# Patient Record
Sex: FEMALE | Race: WHITE | URBAN - METROPOLITAN AREA
[De-identification: names, ages, dates, MRNs, and addresses within clinical notes are randomized per-mention and may not be internally consistent; named-entity substitution may affect disease eponyms.]

---

## 2021-02-24 ENCOUNTER — TRANSFERRED RECORDS (OUTPATIENT)
Dept: HEALTH INFORMATION MANAGEMENT | Facility: CLINIC | Age: 76
End: 2021-02-24

## 2024-02-07 ENCOUNTER — LAB REQUISITION (OUTPATIENT)
Dept: LAB | Facility: CLINIC | Age: 79
End: 2024-02-07
Payer: COMMERCIAL

## 2024-02-07 DIAGNOSIS — Z11.1 ENCOUNTER FOR SCREENING FOR RESPIRATORY TUBERCULOSIS: ICD-10-CM

## 2024-02-08 ENCOUNTER — TRANSITIONAL CARE UNIT VISIT (OUTPATIENT)
Dept: GERIATRICS | Facility: CLINIC | Age: 79
End: 2024-02-08
Payer: COMMERCIAL

## 2024-02-08 ENCOUNTER — DOCUMENTATION ONLY (OUTPATIENT)
Dept: GERIATRICS | Facility: CLINIC | Age: 79
End: 2024-02-08
Payer: COMMERCIAL

## 2024-02-08 VITALS
OXYGEN SATURATION: 97 % | TEMPERATURE: 97.1 F | DIASTOLIC BLOOD PRESSURE: 77 MMHG | WEIGHT: 123 LBS | HEART RATE: 78 BPM | RESPIRATION RATE: 20 BRPM | SYSTOLIC BLOOD PRESSURE: 157 MMHG

## 2024-02-08 DIAGNOSIS — Z86.59 HISTORY OF DEPRESSION: ICD-10-CM

## 2024-02-08 DIAGNOSIS — K59.01 SLOW TRANSIT CONSTIPATION: ICD-10-CM

## 2024-02-08 DIAGNOSIS — F41.1 GAD (GENERALIZED ANXIETY DISORDER): ICD-10-CM

## 2024-02-08 DIAGNOSIS — J21.0 RSV (ACUTE BRONCHIOLITIS DUE TO RESPIRATORY SYNCYTIAL VIRUS): ICD-10-CM

## 2024-02-08 DIAGNOSIS — Z86.59 HISTORY OF POSTTRAUMATIC STRESS DISORDER (PTSD): ICD-10-CM

## 2024-02-08 DIAGNOSIS — I10 ESSENTIAL HYPERTENSION: ICD-10-CM

## 2024-02-08 DIAGNOSIS — F51.05: ICD-10-CM

## 2024-02-08 DIAGNOSIS — I27.20 PULMONARY HYPERTENSION, UNSPECIFIED (H): ICD-10-CM

## 2024-02-08 DIAGNOSIS — S72.112D CLOSED DISPLACED FRACTURE OF GREATER TROCHANTER OF LEFT FEMUR WITH ROUTINE HEALING, SUBSEQUENT ENCOUNTER: ICD-10-CM

## 2024-02-08 DIAGNOSIS — G89.29 CHRONIC LOW BACK PAIN, UNSPECIFIED BACK PAIN LATERALITY, UNSPECIFIED WHETHER SCIATICA PRESENT: ICD-10-CM

## 2024-02-08 DIAGNOSIS — C90.00 MULTIPLE MYELOMA NOT HAVING ACHIEVED REMISSION (H): ICD-10-CM

## 2024-02-08 DIAGNOSIS — M54.50 CHRONIC LOW BACK PAIN, UNSPECIFIED BACK PAIN LATERALITY, UNSPECIFIED WHETHER SCIATICA PRESENT: ICD-10-CM

## 2024-02-08 DIAGNOSIS — G43.909 MIGRAINE WITHOUT STATUS MIGRAINOSUS, NOT INTRACTABLE, UNSPECIFIED MIGRAINE TYPE: ICD-10-CM

## 2024-02-08 DIAGNOSIS — M80.00XS AGE-RELATED OSTEOPOROSIS WITH CURRENT PATHOLOGICAL FRACTURE, SEQUELA: ICD-10-CM

## 2024-02-08 DIAGNOSIS — M51.369 DDD (DEGENERATIVE DISC DISEASE), LUMBAR: ICD-10-CM

## 2024-02-08 DIAGNOSIS — R05.3 CHRONIC COUGH: ICD-10-CM

## 2024-02-08 DIAGNOSIS — S52.502D CLOSED FRACTURE OF DISTAL END OF LEFT RADIUS WITH ROUTINE HEALING, UNSPECIFIED FRACTURE MORPHOLOGY, SUBSEQUENT ENCOUNTER: Primary | ICD-10-CM

## 2024-02-08 PROBLEM — R19.5 LOOSE STOOLS: Status: ACTIVE | Noted: 2023-04-20

## 2024-02-08 PROBLEM — H35.3132 NONEXUDATIVE AGE-RELATED MACULAR DEGENERATION, BILATERAL, INTERMEDIATE DRY STAGE: Status: ACTIVE | Noted: 2021-04-14

## 2024-02-08 PROBLEM — R33.8 ACUTE URINARY RETENTION: Status: ACTIVE | Noted: 2020-03-11

## 2024-02-08 PROBLEM — R53.83 FATIGUE: Status: ACTIVE | Noted: 2024-02-08

## 2024-02-08 PROBLEM — D59.12: Status: ACTIVE | Noted: 2021-05-21

## 2024-02-08 PROBLEM — H43.813 PVD (POSTERIOR VITREOUS DETACHMENT), BOTH EYES: Status: ACTIVE | Noted: 2021-04-14

## 2024-02-08 PROBLEM — A41.9 SEPSIS WITH ACUTE HYPOXIC RESPIRATORY FAILURE WITHOUT SEPTIC SHOCK, DUE TO UNSPECIFIED ORGANISM (H): Status: ACTIVE | Noted: 2022-09-13

## 2024-02-08 PROBLEM — J18.9 PNEUMONIA OF RIGHT LOWER LOBE DUE TO INFECTIOUS ORGANISM: Status: ACTIVE | Noted: 2021-09-15

## 2024-02-08 PROBLEM — N30.00 ACUTE CYSTITIS WITHOUT HEMATURIA: Status: ACTIVE | Noted: 2024-02-02

## 2024-02-08 PROBLEM — T40.2X1A OPIOID OVERDOSE, ACCIDENTAL OR UNINTENTIONAL, INITIAL ENCOUNTER (H): Status: ACTIVE | Noted: 2022-06-22

## 2024-02-08 PROBLEM — J96.01 SEPSIS WITH ACUTE HYPOXIC RESPIRATORY FAILURE WITHOUT SEPTIC SHOCK, DUE TO UNSPECIFIED ORGANISM (H): Status: ACTIVE | Noted: 2022-09-13

## 2024-02-08 PROBLEM — I38 CARDIAC VALVULOPATHY: Status: ACTIVE | Noted: 2021-08-13

## 2024-02-08 PROBLEM — G47.00 INSOMNIA: Status: ACTIVE | Noted: 2020-03-13

## 2024-02-08 PROBLEM — Z87.19 HISTORY OF GI BLEED: Status: ACTIVE | Noted: 2023-01-11

## 2024-02-08 PROBLEM — D47.2 SMOLDERING MULTIPLE MYELOMA (SMM): Status: ACTIVE | Noted: 2021-05-21

## 2024-02-08 PROBLEM — R65.20 SEPSIS WITH ACUTE HYPOXIC RESPIRATORY FAILURE WITHOUT SEPTIC SHOCK, DUE TO UNSPECIFIED ORGANISM (H): Status: ACTIVE | Noted: 2022-09-13

## 2024-02-08 PROBLEM — M80.00XA AGE-RELATED OSTEOPOROSIS WITH CURRENT PATHOLOGICAL FRACTURE: Chronic | Status: ACTIVE | Noted: 2024-02-02

## 2024-02-08 PROBLEM — S72.113A: Status: ACTIVE | Noted: 2024-02-02

## 2024-02-08 PROBLEM — R05.9 COUGH: Status: ACTIVE | Noted: 2019-07-30

## 2024-02-08 PROBLEM — R06.09 DYSPNEA ON EXERTION: Status: ACTIVE | Noted: 2022-09-13

## 2024-02-08 PROBLEM — M54.9 BACK PAIN: Status: ACTIVE | Noted: 2024-02-08

## 2024-02-08 PROBLEM — M35.3 POLYMYALGIA RHEUMATICA (H): Chronic | Status: ACTIVE | Noted: 2020-03-13

## 2024-02-08 PROBLEM — K52.1 DIARRHEA DUE TO DRUG: Status: ACTIVE | Noted: 2024-02-08

## 2024-02-08 PROBLEM — S62.102A CLOSED FRACTURE OF LEFT WRIST: Status: ACTIVE | Noted: 2024-02-02

## 2024-02-08 PROBLEM — B33.8 RSV (RESPIRATORY SYNCYTIAL VIRUS INFECTION): Status: ACTIVE | Noted: 2024-02-01

## 2024-02-08 PROCEDURE — 99306 1ST NF CARE HIGH MDM 50: CPT | Performed by: NURSE PRACTITIONER

## 2024-02-08 PROCEDURE — P9604 ONE-WAY ALLOW PRORATED TRIP: HCPCS | Mod: ORL | Performed by: NURSE PRACTITIONER

## 2024-02-08 PROCEDURE — 86481 TB AG RESPONSE T-CELL SUSP: CPT | Mod: ORL | Performed by: NURSE PRACTITIONER

## 2024-02-08 PROCEDURE — 36415 COLL VENOUS BLD VENIPUNCTURE: CPT | Mod: ORL | Performed by: NURSE PRACTITIONER

## 2024-02-08 RX ORDER — FLUTICASONE PROPIONATE 50 MCG
1 SPRAY, SUSPENSION (ML) NASAL DAILY
COMMUNITY
End: 2024-02-08

## 2024-02-08 RX ORDER — BENZONATATE 100 MG/1
100 CAPSULE ORAL 3 TIMES DAILY PRN
COMMUNITY

## 2024-02-08 RX ORDER — ONDANSETRON 4 MG/1
4 TABLET, ORALLY DISINTEGRATING ORAL 2 TIMES DAILY
COMMUNITY

## 2024-02-08 RX ORDER — METOPROLOL SUCCINATE 100 MG/1
100 TABLET, EXTENDED RELEASE ORAL DAILY
COMMUNITY

## 2024-02-08 RX ORDER — TRAZODONE HYDROCHLORIDE 50 MG/1
100 TABLET, FILM COATED ORAL
COMMUNITY

## 2024-02-08 RX ORDER — BUPRENORPHINE HYDROCHLORIDE AND NALOXONE HYDROCHLORIDE DIHYDRATE 2; .5 MG/1; MG/1
1 TABLET SUBLINGUAL EVERY MORNING
COMMUNITY

## 2024-02-08 RX ORDER — SERTRALINE HYDROCHLORIDE 100 MG/1
200 TABLET, FILM COATED ORAL DAILY
COMMUNITY

## 2024-02-08 RX ORDER — POLYETHYLENE GLYCOL 3350 17 G/17G
17 POWDER, FOR SOLUTION ORAL DAILY PRN
COMMUNITY

## 2024-02-08 RX ORDER — IPRATROPIUM BROMIDE 21 UG/1
2 SPRAY, METERED NASAL 3 TIMES DAILY
COMMUNITY

## 2024-02-08 RX ORDER — AMLODIPINE BESYLATE 5 MG/1
5 TABLET ORAL DAILY
COMMUNITY

## 2024-02-08 RX ORDER — MULTIVITAMIN WITH IRON
100 TABLET ORAL DAILY
COMMUNITY

## 2024-02-08 RX ORDER — BISACODYL 10 MG
10 SUPPOSITORY, RECTAL RECTAL DAILY PRN
COMMUNITY

## 2024-02-08 RX ORDER — ESTRADIOL 0.1 MG/G
CREAM VAGINAL DAILY
COMMUNITY

## 2024-02-08 RX ORDER — ACETAMINOPHEN 500 MG
1000 TABLET ORAL 3 TIMES DAILY
COMMUNITY

## 2024-02-08 RX ORDER — PANTOPRAZOLE SODIUM 40 MG/1
40 TABLET, DELAYED RELEASE ORAL 2 TIMES DAILY
COMMUNITY

## 2024-02-08 RX ORDER — NARATRIPTAN 2.5 MG/1
2.5 TABLET ORAL
COMMUNITY

## 2024-02-08 RX ORDER — POTASSIUM CHLORIDE 1500 MG/1
20 TABLET, EXTENDED RELEASE ORAL 2 TIMES DAILY
COMMUNITY

## 2024-02-08 RX ORDER — LISINOPRIL 5 MG/1
5 TABLET ORAL DAILY
COMMUNITY

## 2024-02-08 RX ORDER — HYDROMORPHONE HYDROCHLORIDE 2 MG/1
1 TABLET ORAL
COMMUNITY

## 2024-02-08 NOTE — LETTER
2/8/2024        RE: German Sharpe  [address]  [facility] [address]        M [facility] GERIATRICS    PRIMARY CARE PROVIDER AND CLINIC:  Coni Rodriguez, [facility]  Chief Complaint   Patient presents with     Hospital F/U      [facility] Medical Record Number:  [id number]  Place of Service where encounter took place:  [facility] (TCU) [[id number]]    German Sharpe  is a 79 year old  (1945), admitted to the above facility from  [facility] . Hospital stay 2/1 through 2/7..  German had a fall 1/27 while walking her dog.  Noted L wrist pain and gradual increase of L hip pain, eventually presented to ED on 2/1 and was diagnosed with L greater trochanter fracture and L distal radius and avulsion fracture of ulnar styloid.  Ortho consulted, non-operative management with NWB to LUE in cast and WBAT to LLE with no crossing midline or active hip abduction.  Upon admission was found to have RSV-- symptomatic with generalized weakness and cough but stable on RA and chest XR clear.  UTI ruled out.  Had some soft blood pressures, amlodipine and lisinopril was held but resumed on discharge.  Of note, she is on suboxone for chronic back pain.  She participated in therapies at hospital, made progress but was recommended to discharge to TCU given need for moderate assist with dressing (forgets hip precautions), CGA toileting, doffing brief, moderate assist to don brief, unsteadiness with gait, assist of 1 with platform walker, ambulating 40' x2.  PT note indicates she would need 24/7 supervision on discharge and if [facility] could not provide would require TCU.  Only new med at discharge: hydromorphone 1mg q3h PRN.  Only med change: acetaminophen frequency updated to 1000mg TID.      Today's Visit:  German is seen in her room, sitting EOB with walker.  LUE in short arm cast.  She reports she is doing well today, does not understand why she is at TCU and wants to return  "to her AL.  States, \"I am doing everything independently\" and is frustrated that staff are telling her she needs to ask for help to bathroom.  Writer explained hospital therapy recommended TCU to regain strength and functional mobility prior to returning to her apartment.  Reports 3/10 pain to LUE.  Pain to LLE with movement/weight bearing.  Well-managed with acetaminophen TID and her suboxone.  Patient has not requested hydromorphone since arrival to TCU.  First therapy session this AM.  German denies cough or shortness of breath.  Denies chest pain, palpitations.  Denies constipation or diarrhea.  Requests her Miralax be switched to PRN as she is having no issues with constipation (reports she was refusing it in hospital as well).  Reports normal appetite, no nausea.  Slept well last night although felt she was woken up frequently by staff.      CODE STATUS/ADVANCE DIRECTIVES DISCUSSION:  DNR / DNI  ALLERGIES:   Allergies   Allergen Reactions     Blood-Group Specific Substance Unknown     Other Reaction(s): *Unknown, Other (see comments)    Pt has a non-specific antibody.  Blood product orders may be delayed.  Draw one red top and two purple top tubes for all Type and Crossmatch/Type and Screen orders.      Pt has a non-specific antibody.  Blood product orders may be delayed.  Draw one red top and two purple top tubes for all Type and Crossmatch/Type and Screen orders.      Other reaction(s): Other (see comments) Pt has a non-specific antibody.  Blood product orders may be delayed.  Draw one red top and two purple top tubes for all Type and Crossmatch/Type and Screen orders.    Pt has a non-specific antibody.  Blood product orders may be delayed.  Draw one red top and two purple top tubes for all Type and Crossmatch/Type and Screen orders.    Pt has a non-specific antibody.  Blood product orders may be delayed.  Draw one red top and two purple top tubes for all Type and Crossmatch/Type and Screen orders.     Nsaids " Nausea and Vomiting     Other Reaction(s): Gastrointestinal      PAST MEDICAL HISTORY:   Past Medical History:   Diagnosis Date     Anxiety      Chronic back pain      H/O malignant neoplasm of breast      Hypertension      Insomnia      Multiple myeloma not having achieved remission (H)      Paraesophageal hernia      PTSD (post-traumatic stress disorder)      Pulmonary hypertension (H)       PAST SURGICAL HISTORY:   has a past surgical history that includes Lumpectomy breast (Right, );  section (); hysterectomy, rosita; appendectomy; and Spinal Fusion.  FAMILY HISTORY: family history includes Alcoholism in her father; Hypertension in her brother; Osteosarcoma in her brother.  SOCIAL HISTORY:   reports that she has quit smoking. Her smoking use included cigarettes. She does not have any smokeless tobacco history on file.  Patient's living condition: lives in an assisted living facility    Post Discharge Medication Reconciliation Status:   MED REC REQUIRED  Post Medication Reconciliation Status: discharge medications reconciled and changed, per note/orders       Current Outpatient Medications   Medication Sig     acetaminophen (TYLENOL) 500 MG tablet Take 1,000 mg by mouth 3 times daily     amLODIPine (NORVASC) 5 MG tablet Take 5 mg by mouth daily     benzonatate (TESSALON) 100 MG capsule Take 100 mg by mouth 3 times daily as needed for cough     bisacodyl (DULCOLAX) 10 MG suppository Place 10 mg rectally daily as needed for constipation     buprenorphine-naloxone (SUBOXONE) 2-0.5 MG SUBL sublingual tablet Place 1 tablet under the tongue every morning And give 0.5 tablet sublingually in the evening for Pain     Calcium Carbonate Antacid (CALCIUM CARBONATE PO) Take by mouth daily Calcium + Vitamin D3 Oral Tablet 600-5 MG-MCG     estradiol (ESTRACE) 0.1 MG/GM vaginal cream Place vaginally daily     galcanezumab-gnlm (EMGALITY) 120 MG/ML injection Inject 120 mg Subcutaneous every 28 days     HYDROmorphone  (DILAUDID) 2 MG tablet Take 1 mg by mouth every 3 hours as needed for severe pain     ipratropium (ATROVENT) 0.03 % nasal spray Spray 2 sprays into both nostrils 3 times daily     lisinopril (ZESTRIL) 5 MG tablet Take 5 mg by mouth daily     metoprolol succinate ER (TOPROL XL) 100 MG 24 hr tablet Take 100 mg by mouth daily     Multiple Vitamins-Minerals (SYSTANE ICAPS AREDS2 PO) Take 1 tablet by mouth 2 times daily     naratriptan (AMERGE) 2.5 MG tablet Take 2.5 mg by mouth at onset of headache for migraine     ondansetron (ZOFRAN ODT) 4 MG ODT tab Take 4 mg by mouth 2 times daily     pantoprazole (PROTONIX) 40 MG EC tablet Take 40 mg by mouth 2 times daily     polyethylene glycol (MIRALAX) 17 g packet Take 17 g by mouth daily as needed for constipation     potassium chloride ER (K-TAB) 20 MEQ CR tablet Take 20 mEq by mouth 2 times daily     psyllium (METAMUCIL/KONSYL) 58.6 % powder Take by mouth daily     sertraline (ZOLOFT) 100 MG tablet Take 200 mg by mouth daily     traZODone (DESYREL) 50 MG tablet Take 100 mg by mouth nightly as needed for sleep     vitamin B6 (PYRIDOXINE) 100 MG tablet Take 100 mg by mouth daily     No current facility-administered medications for this visit.       ROS:  No chest pain, shortness of breath, fevers, chills, headache, nausea, vomiting, dysuria or bowel abnormalities.  Appetite is  normal.  No pain except 3-4/10 in L wrist and L hip/leg.    Vitals:  BP (!) 157/77   Pulse 78   Temp 97.1  F (36.2  C)   Resp 20   Wt 55.8 kg (123 lb)   SpO2 97%   Exam:  GENERAL APPEARANCE:  Alert, in no distress, cooperative  ENT:  Mouth and posterior oropharynx normal, moist mucous membranes, [place]  RESP:  respiratory effort and palpation of chest normal, lungs clear to auscultation , no respiratory distress  CV:  Palpation and auscultation of heart done , regular rate and rhythm, no murmur, rub, or gallop, no edema  ABDOMEN:  normal bowel sounds, soft, nontender, no hepatosplenomegaly or other  masses  M/S:   Short arm cast to LUE-- fingers warm to touch with intact ROM, no edema, slight ecchymosis to 2nd and 3rd base of finger.  LLE:  No tenderness with palpation along lateral thigh, able to lift leg with knee flexed but unable to perform SLR d/t pain.  SKIN:  No visible lesions or rashes.  No incisions.  No wounds per nursing  PSYCH:  oriented X 3    Lab/Diagnostic data:  Done at [facility]             ASSESSMENT/PLAN:  (S5.057D) Closed fracture of distal end of left radius with routine healing, unspecified fracture morphology, subsequent encounter  (primary encounter diagnosis)  (S72.112D) Closed displaced fracture of greater trochanter of left femur with routine healing, subsequent encounter  (M80.00XS) Age-related osteoporosis with current pathological fracture, sequela  Stable.  DOI: 1/27, mechanical fall while walking dog.  Non-operative management with WBAT to LLE and short arm cast to LUE NWB.  Pain ranging 3-5/10 per nursing and patient report.  Has not requested hydromorphone since admission to TCU.  [facility]-- appears they have their Geriatric Ortho Outreach team following her.  PCP ordered a DEXA scan 1/15 prior to her injuries, no previous DEXA scans.  Per PCP, German is open to treatment for osteoporosis and provider is thinking of Reclast > alendronate given history of GI issues.    Plan:   -WBAT to left lower extremity-- no active abduction or crossing L leg over midline  -NWB to left upper extremity  -Pain mgmt with acetaminophen 1000mg TID, hydromorphone 1mg q3h PRN, and her baseline suboxone 2-0.5mg (1 tab in AM and 1/2 tab in PM)  -Patient requests diclofenac be discontinued as she does not use at baseline  -Continue calcium + D3 supplement daily  -PT/OT  -Geriatric Ortho Outreach through [facility] to follow orthopaedically, anticipate repeat x-rays in 2-3 weeks  -Should see PCP or  [facility] upon TCU discharge for initiation of bisphosphonate  for her osteoporosis    (C90.00) Multiple myeloma not having achieved remission (H)  Chronic.  Diagnosed in 2021.  Was following with [facility] (Dr. Richardson) previously and had undergone treatment with chemotherapy: bortezomib, lenalidomide and dexamethasone, venetoclax.   Does not appear she has had any treatment since 2022. Per PCP note from Jan 2024, she is now following with a provider at [facility] in [place].    Plan:   -Follow-up with Oncology provider upon discharge from TCU    (M51.36) DDD (degenerative disc disease), lumbar  (M54.50,  G89.29) Chronic low back pain, unspecified back pain laterality, unspecified whether sciatica present  Chronic.  Follows with Dr. Andersen at [facility] for chronic continuous use of opioids.  Patient has history of two opioid overdoses, most recent in June 2022.  Started on Suboxone by Dr. Andersen in July 2022 with dose increased to 3mg April 2023.  No longer taking oral opioids and has not been utilizing the hydromorphone prescribed with most recent hospitalization.  Plan:   -Writer to provide refills of Suboxone during TCU stay, patient will follow-up with Dr. Andersen upon discharge for ongoing refills  -PT/OT  -Acetaminophen 1000mg TID ongoing    (J21.0) RSV (acute bronchiolitis due to respiratory syncytial virus)  (R05.3) Chronic cough  Stable.  RSV resolving, no symptoms presently.  Fatigue per her baseline.  O2 sats >93% on RA since admission to TCU.  History of chronic cough, although she denies issues presently.  TCU infection control nurse to assess for lifting of isolation precautions today.  Plan:   -Isolation precautions likely ending today  -Encourage activity/ambulation     (I10) Essential hypertension  (I27.20) Pulmonary hypertension, unspecified (H)  Acute on chronic.  Primary provider had noted 1/15 that patient's BP was elevated at clinic and had advised more frequent checks by patient's [facility] staff-- unclear if this was done.  No med adjustments made at  that time.  Since arrival to TCU, systolic BP has ranged 119-157.  [facility]once noting that she has been more agitated (per staff) today and wants to leave AMA, feels she does not need TCU, this may be contributing to her elevated BP.    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  Presently she is more frustrated with being in TCU setting and wants to return home to her dog.  Reports her dog is going to visit her today which she is looking forward to.  Has met with mental health provider in the past virtually, not established with anyone presently but does not feel it is necessary.    Plan:   -Continue sertraline 200mg daily  -Continue trazadone 100mg at HS PRN insomnia  -Avoid screens 30 minutes prior to bedtime  -Staff to keep her informed on her progress towards discharge back to her [facility] (goals to be met, etc)    (K59.01) Slow transit constipation  Chronic.  Denies issues presently, states she has daily bowel movements without issue.  Reports she refused Miralax in the hospital and DOES NOT want it scheduled here, even every other day.  Counseled that writer will make it PRN so it is available if she needs it.  Plan:   -Encourage fluids and physical activity  -Continue psyllium daily  -Change Miralax from scheduled every other day to daily PRN    (G43.909) Migraine without status migrainosus, not intractable, unspecified migraine type  Chronic, stable.  Presently managed with monthly Emgality injections, although unclear when last one was administered and if she self administers or her [facility] staff.  No information in EPIC or Care Everywhere.  Also uses naratriptan 2.5mg PRN onset headache.  Denies recent  migraines.  Plan:   -Continue PRN naratriptan 2.5mg, 1 tab at onset of headache  -TCU to clarify when patient last received Emgality injection (check with AL), hold on administering until it is due (every 28 days)    -----------------------------------------------------------------------------    Orders:  -Change Miralax from scheduled every other day to daily PRN constipation  -Discontinue fluticasone and diclofenac (patient states she does not use)        Electronically signed by:  BILLY Wang CNP                    Sincerely,        BILLY Wang CNP

## 2024-02-08 NOTE — PROGRESS NOTES
"[facility] GERIATRICS    PRIMARY CARE PROVIDER AND CLINIC:  Coni Rodriguez, [facility]  Chief Complaint   Patient presents with    Hospital F/U      [facility] Medical Record Number:  [id number]  Place of Service where encounter took place:  [facility] [[id number]]    German Sharpe  is a 79 year old  (1945), admitted to the above facility from  [facility] . Hospital stay 2/1 through 2/7..  German had a fall 1/27 while walking her dog.  Noted L wrist pain and gradual increase of L hip pain, eventually presented to ED on 2/1 and was diagnosed with L greater trochanter fracture and L distal radius and avulsion fracture of ulnar styloid.  Ortho consulted, non-operative management with NWB to LUE in cast and WBAT to LLE with no crossing midline or active hip abduction.  Upon admission was found to have RSV-- symptomatic with generalized weakness and cough but stable on RA and chest XR clear.  UTI ruled out.  Had some soft blood pressures, amlodipine and lisinopril was held but resumed on discharge.  Of note, she is on suboxone for chronic back pain.  She participated in therapies at hospital, made progress but was recommended to discharge to TCU given need for moderate assist with dressing (forgets hip precautions), CGA toileting, doffing brief, moderate assist to don brief, unsteadiness with gait, assist of 1 with platform walker, ambulating 40' x2.  PT note indicates she would need 24/7 supervision on discharge and if [facility] could not provide would require TCU.  Only new med at discharge: hydromorphone 1mg q3h PRN.  Only med change: acetaminophen frequency updated to 1000mg TID.      Today's Visit:  German is seen in her room, sitting EOB with walker.  LUE in short arm cast.  She reports she is doing well today, does not understand why she is at TCU and wants to return to her AL.  States, \"I am doing everything independently\" and is frustrated that staff are " telling her she needs to ask for help to bathroom.  Writer explained hospital therapy recommended TCU to regain strength and functional mobility prior to returning to her apartment.  Reports 3/10 pain to LUE.  Pain to LLE with movement/weight bearing.  Well-managed with acetaminophen TID and her suboxone.  Patient has not requested hydromorphone since arrival to TCU.  First therapy session this AM.  German denies cough or shortness of breath.  Denies chest pain, palpitations.  Denies constipation or diarrhea.  Requests her Miralax be switched to PRN as she is having no issues with constipation (reports she was refusing it in hospital as well).  Reports normal appetite, no nausea.  Slept well last night although felt she was woken up frequently by staff.      CODE STATUS/ADVANCE DIRECTIVES DISCUSSION:  DNR / DNI  ALLERGIES:   Allergies   Allergen Reactions    Blood-Group Specific Substance Unknown     Other Reaction(s): *Unknown, Other (see comments)    Pt has a non-specific antibody.  Blood product orders may be delayed.  Draw one red top and two purple top tubes for all Type and Crossmatch/Type and Screen orders.      Pt has a non-specific antibody.  Blood product orders may be delayed.  Draw one red top and two purple top tubes for all Type and Crossmatch/Type and Screen orders.      Other reaction(s): Other (see comments) Pt has a non-specific antibody.  Blood product orders may be delayed.  Draw one red top and two purple top tubes for all Type and Crossmatch/Type and Screen orders.    Pt has a non-specific antibody.  Blood product orders may be delayed.  Draw one red top and two purple top tubes for all Type and Crossmatch/Type and Screen orders.    Pt has a non-specific antibody.  Blood product orders may be delayed.  Draw one red top and two purple top tubes for all Type and Crossmatch/Type and Screen orders.    Nsaids Nausea and Vomiting     Other Reaction(s): Gastrointestinal      PAST MEDICAL HISTORY:    Past Medical History:   Diagnosis Date    Anxiety     Chronic back pain     H/O malignant neoplasm of breast     Hypertension     Insomnia     Multiple myeloma not having achieved remission (H)     Paraesophageal hernia     PTSD (post-traumatic stress disorder)     Pulmonary hypertension (H)       PAST SURGICAL HISTORY:   has a past surgical history that includes Lumpectomy breast (Right, );  section (); hysterectomy, rosita; appendectomy; and Spinal Fusion.  FAMILY HISTORY: family history includes Alcoholism in her father; Hypertension in her brother; Osteosarcoma in her brother.  SOCIAL HISTORY:   reports that she has quit smoking. Her smoking use included cigarettes. She does not have any smokeless tobacco history on file.  Patient's living condition: lives in an assisted living facility    Post Discharge Medication Reconciliation Status:   MED REC REQUIRED  Post Medication Reconciliation Status: discharge medications reconciled and changed, per note/orders       Current Outpatient Medications   Medication Sig    acetaminophen (TYLENOL) 500 MG tablet Take 1,000 mg by mouth 3 times daily    amLODIPine (NORVASC) 5 MG tablet Take 5 mg by mouth daily    benzonatate (TESSALON) 100 MG capsule Take 100 mg by mouth 3 times daily as needed for cough    bisacodyl (DULCOLAX) 10 MG suppository Place 10 mg rectally daily as needed for constipation    buprenorphine-naloxone (SUBOXONE) 2-0.5 MG SUBL sublingual tablet Place 1 tablet under the tongue every morning And give 0.5 tablet sublingually in the evening for Pain    Calcium Carbonate Antacid (CALCIUM CARBONATE PO) Take by mouth daily Calcium + Vitamin D3 Oral Tablet 600-5 MG-MCG    estradiol (ESTRACE) 0.1 MG/GM vaginal cream Place vaginally daily    galcanezumab-gnlm (EMGALITY) 120 MG/ML injection Inject 120 mg Subcutaneous every 28 days    HYDROmorphone (DILAUDID) 2 MG tablet Take 1 mg by mouth every 3 hours as needed for severe pain    ipratropium  (ATROVENT) 0.03 % nasal spray Spray 2 sprays into both nostrils 3 times daily    lisinopril (ZESTRIL) 5 MG tablet Take 5 mg by mouth daily    metoprolol succinate ER (TOPROL XL) 100 MG 24 hr tablet Take 100 mg by mouth daily    Multiple Vitamins-Minerals (SYSTANE ICAPS AREDS2 PO) Take 1 tablet by mouth 2 times daily    naratriptan (AMERGE) 2.5 MG tablet Take 2.5 mg by mouth at onset of headache for migraine    ondansetron (ZOFRAN ODT) 4 MG ODT tab Take 4 mg by mouth 2 times daily    pantoprazole (PROTONIX) 40 MG EC tablet Take 40 mg by mouth 2 times daily    polyethylene glycol (MIRALAX) 17 g packet Take 17 g by mouth daily as needed for constipation    potassium chloride ER (K-TAB) 20 MEQ CR tablet Take 20 mEq by mouth 2 times daily    psyllium (METAMUCIL/KONSYL) 58.6 % powder Take by mouth daily    sertraline (ZOLOFT) 100 MG tablet Take 200 mg by mouth daily    traZODone (DESYREL) 50 MG tablet Take 100 mg by mouth nightly as needed for sleep    vitamin B6 (PYRIDOXINE) 100 MG tablet Take 100 mg by mouth daily     No current facility-administered medications for this visit.       ROS:  No chest pain, shortness of breath, fevers, chills, headache, nausea, vomiting, dysuria or bowel abnormalities.  Appetite is  normal.  No pain except 3-4/10 in L wrist and L hip/leg.    Vitals:  BP (!) 157/77   Pulse 78   Temp 97.1  F (36.2  C)   Resp 20   Wt 55.8 kg (123 lb)   SpO2 97%   Exam:  GENERAL APPEARANCE:  Alert, in no distress, cooperative  ENT:  Mouth and posterior oropharynx normal, moist mucous membranes, [place]  RESP:  respiratory effort and palpation of chest normal, lungs clear to auscultation , no respiratory distress  CV:  Palpation and auscultation of heart done , regular rate and rhythm, no murmur, rub, or gallop, no edema  ABDOMEN:  normal bowel sounds, soft, nontender, no hepatosplenomegaly or other masses  M/S:   Short arm cast to LUE-- fingers warm to touch with intact ROM, no edema, slight ecchymosis to 2nd  and 3rd base of finger.  LLE:  No tenderness with palpation along lateral thigh, able to lift leg with knee flexed but unable to perform SLR d/t pain.  SKIN:  No visible lesions or rashes.  No incisions.  No wounds per nursing  PSYCH:  oriented X 3    Lab/Diagnostic data:  Done at [facility]             ASSESSMENT/PLAN:  (S52.850D) Closed fracture of distal end of left radius with routine healing, unspecified fracture morphology, subsequent encounter  (primary encounter diagnosis)  (S72.112D) Closed displaced fracture of greater trochanter of left femur with routine healing, subsequent encounter  (M80.00XS) Age-related osteoporosis with current pathological fracture, sequela  Stable.  DOI: 1/27, mechanical fall while walking dog.  Non-operative management with WBAT to LLE and short arm cast to LUE NWB.  Pain ranging 3-5/10 per nursing and patient report.  Has not requested hydromorphone since admission to TCU.  Unclear when she is due to follow-up with [facility]-- appears they have their Geriatric Ortho Outreach team following her.  PCP ordered a DEXA scan 1/15 prior to her injuries, no previous DEXA scans.  Per PCP, German is open to treatment for osteoporosis and provider is thinking of Reclast > alendronate given history of GI issues.    Plan:   -WBAT to left lower extremity-- no active abduction or crossing L leg over midline  -NWB to left upper extremity  -Pain mgmt with acetaminophen 1000mg TID, hydromorphone 1mg q3h PRN, and her baseline suboxone 2-0.5mg (1 tab in AM and 1/2 tab in PM)  -Patient requests diclofenac be discontinued as she does not use at baseline  -Continue calcium + D3 supplement daily  -PT/OT  -Geriatric Ortho Outreach through [facility] to follow orthopaedically, anticipate repeat x-rays in 2-3 weeks  -Should see PCP or  [facility] upon TCU discharge for initiation of bisphosphonate for her osteoporosis    (C90.00) Multiple myeloma not having achieved remission (H)  Chronic.  Diagnosed in  2021.  Was following with [place] (Dr. Richardson) previously and had undergone treatment with chemotherapy: bortezomib, lenalidomide and dexamethasone, venetoclax.   Does not appear she has had any treatment since 2022. Per PCP note from Jan 2024, she is now following with a provider at [facility] in [place].    Plan:   -Follow-up with Oncology provider upon discharge from TCU    (M51.36) DDD (degenerative disc disease), lumbar  (M54.50,  G89.29) Chronic low back pain, unspecified back pain laterality, unspecified whether sciatica present  Chronic.  Follows with Dr. Andersen at [facility] for chronic continuous use of opioids.  Patient has history of two opioid overdoses, most recent in June 2022.  Started on Suboxone by Dr. Andersen in July 2022 with dose increased to 3mg April 2023.  No longer taking oral opioids and has not been utilizing the hydromorphone prescribed with most recent hospitalization.  Plan:   -Writer to provide refills of Suboxone during TCU stay, patient will follow-up with Dr. Andersen upon discharge for ongoing refills  -PT/OT  -Acetaminophen 1000mg TID ongoing    (J21.0) RSV (acute bronchiolitis due to respiratory syncytial virus)  (R05.3) Chronic cough  Stable.  RSV resolving, no symptoms presently.  Fatigue per her baseline.  O2 sats >93% on RA since admission to TCU.  History of chronic cough, although she denies issues presently.  TCU infection control nurse to assess for lifting of isolation precautions today.  Plan:   -Isolation precautions likely ending today  -Encourage activity/ambulation     (I10) Essential hypertension  (I27.20) Pulmonary hypertension, unspecified (H)  Acute on chronic.  Primary provider had noted 1/15 that patient's BP was elevated at clinic and had advised more frequent checks by patient's [facility] staff-- unclear if this was done.  No med adjustments made at that time.  Since arrival to TCU, systolic BP has ranged 119-157.  [place] noting that she has been more agitated  (per staff) today and wants to leave AMA, feels she does not need TCU, this may be contributing to her elevated BP.    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  Presently she is more frustrated with being in TCU setting and wants to return home to her dog.  Reports her dog is going to visit her today which she is looking forward to.  Has met with mental health provider in the past virtually, not established with anyone presently but does not feel it is necessary.    Plan:   -Continue sertraline 200mg daily  -Continue trazadone 100mg at HS PRN insomnia  -Avoid screens 30 minutes prior to bedtime  -Staff to keep her informed on her progress towards discharge back to her longterm (goals to be met, etc)    (K59.01) Slow transit constipation  Chronic.  Denies issues presently, states she has daily bowel movements without issue.  Reports she refused Miralax in the hospital and DOES NOT want it scheduled here, even every other day.  Counseled that writer will make it PRN so it is available if she needs it.  Plan:   -Encourage fluids and physical activity  -Continue psyllium daily  -Change Miralax from scheduled every other day to daily PRN    (G43.909) Migraine without status migrainosus, not intractable, unspecified migraine type  Chronic, stable.  Presently managed with monthly Emgality injections, although unclear when last one was administered and if she self administers or her longterm staff.  No information in EPIC or Care Everywhere.  Also uses naratriptan 2.5mg PRN onset headache.  Denies recent migraines.  Plan:   -Continue PRN naratriptan 2.5mg, 1 tab at onset of headache  -TCU to clarify when patient last  received Emgality injection (check with AL), hold on administering until it is due (every 28 days)    -----------------------------------------------------------------------------    Orders:  -Change Miralax from scheduled every other day to daily PRN constipation  -Discontinue fluticasone and diclofenac (patient states she does not use)        Electronically signed by:  Sherley Lopez, BILLY CNP

## 2024-02-09 LAB
QUANTIFERON MITOGEN: 10 IU/ML
QUANTIFERON TB1 TUBE: 0.02 IU/ML
QUANTIFERON TB2 TUBE: 0.03

## 2024-02-10 LAB
GAMMA INTERFERON BACKGROUND BLD IA-ACNC: 0.02 IU/ML
M TB IFN-G BLD-IMP: NEGATIVE
M TB IFN-G CD4+ BCKGRND COR BLD-ACNC: 9.98 IU/ML
MITOGEN IGNF BCKGRD COR BLD-ACNC: 0 IU/ML
MITOGEN IGNF BCKGRD COR BLD-ACNC: 0.01 IU/ML
QUANTIFERON NIL TUBE: 0.02 IU/ML

## 2024-02-12 ENCOUNTER — TRANSITIONAL CARE UNIT VISIT (OUTPATIENT)
Dept: GERIATRICS | Facility: CLINIC | Age: 79
End: 2024-02-12
Payer: COMMERCIAL

## 2024-02-12 VITALS
OXYGEN SATURATION: 97 % | HEART RATE: 77 BPM | SYSTOLIC BLOOD PRESSURE: 134 MMHG | RESPIRATION RATE: 16 BRPM | DIASTOLIC BLOOD PRESSURE: 72 MMHG | WEIGHT: 121.1 LBS | TEMPERATURE: 97.3 F

## 2024-02-12 DIAGNOSIS — Z86.59 HISTORY OF DEPRESSION: ICD-10-CM

## 2024-02-12 DIAGNOSIS — G43.909 MIGRAINE WITHOUT STATUS MIGRAINOSUS, NOT INTRACTABLE, UNSPECIFIED MIGRAINE TYPE: ICD-10-CM

## 2024-02-12 DIAGNOSIS — I10 ESSENTIAL HYPERTENSION: ICD-10-CM

## 2024-02-12 DIAGNOSIS — R41.89 COGNITIVE IMPAIRMENT: ICD-10-CM

## 2024-02-12 DIAGNOSIS — C90.00 MULTIPLE MYELOMA NOT HAVING ACHIEVED REMISSION (H): ICD-10-CM

## 2024-02-12 DIAGNOSIS — S52.502D CLOSED FRACTURE OF DISTAL END OF LEFT RADIUS WITH ROUTINE HEALING, UNSPECIFIED FRACTURE MORPHOLOGY, SUBSEQUENT ENCOUNTER: Primary | ICD-10-CM

## 2024-02-12 DIAGNOSIS — G89.29 CHRONIC LOW BACK PAIN, UNSPECIFIED BACK PAIN LATERALITY, UNSPECIFIED WHETHER SCIATICA PRESENT: ICD-10-CM

## 2024-02-12 DIAGNOSIS — K59.01 SLOW TRANSIT CONSTIPATION: ICD-10-CM

## 2024-02-12 DIAGNOSIS — S72.112D CLOSED DISPLACED FRACTURE OF GREATER TROCHANTER OF LEFT FEMUR WITH ROUTINE HEALING, SUBSEQUENT ENCOUNTER: ICD-10-CM

## 2024-02-12 DIAGNOSIS — M80.00XS AGE-RELATED OSTEOPOROSIS WITH CURRENT PATHOLOGICAL FRACTURE, SEQUELA: ICD-10-CM

## 2024-02-12 DIAGNOSIS — M54.50 CHRONIC LOW BACK PAIN, UNSPECIFIED BACK PAIN LATERALITY, UNSPECIFIED WHETHER SCIATICA PRESENT: ICD-10-CM

## 2024-02-12 DIAGNOSIS — J21.0 RSV (ACUTE BRONCHIOLITIS DUE TO RESPIRATORY SYNCYTIAL VIRUS): ICD-10-CM

## 2024-02-12 DIAGNOSIS — F41.1 GAD (GENERALIZED ANXIETY DISORDER): ICD-10-CM

## 2024-02-12 PROCEDURE — 99316 NF DSCHRG MGMT 30 MIN+: CPT | Performed by: NURSE PRACTITIONER

## 2024-02-12 NOTE — LETTER
2/12/2024        RE: German Sharpe  [address]  [facility] [address]        [facility] GERIATRICS DISCHARGE SUMMARY  PATIENT'S NAME: German Sharpe  YOB: 1945  MEDICAL RECORD NUMBER:  [id number]  Place of Service where encounter took place:  [facility] (TCU) [[id number]]    PRIMARY CARE PROVIDER AND CLINIC RESPONSIBLE AFTER TRANSFER: Coni Rodriguez, [facility]  Physician    Non-[facility] Provider     Transferring providers: BILLY Wang CNP, Dr. Mateo Virgen MD  Recent Hospitalization/ED:  [facility]  stay 2/1 to 2/7.  Date of SNF Admission: February 07, 2024  Date of SNF (anticipated) Discharge: February 13, 2024  Discharged to: previous assisted living  Cognitive Scores: SLUMS: 20/30  Physical Function: Ambulating 150 ft with platform walker  DME: DME F2F done today 2/12 for platform walker    CODE STATUS/ADVANCE DIRECTIVES DISCUSSION:  DNR/DNI  ALLERGIES: Blood-group specific substance and Nsaids    NURSING FACILITY COURSE   Medication Changes/Rationale:   Fluticasone discontinued- patient states she does not use at baseline  Diclofenac gel discontinued- patient states she does not use at baseline  Miralax changed from scheduled to PRN- patient without constipation, infrequent use of narcotics    Summary of nursing facility stay:   (S52502D) Closed fracture of distal end of left radius with routine healing, unspecified fracture morphology, subsequent encounter  (primary encounter diagnosis)  (S72.654D) Closed displaced fracture of greater trochanter of left femur with routine healing, subsequent encounter  Primary reason for TCU admission.  Hospital felt she was not safe to return home without additional therapies.  Patient has not felt TCU was necessary for the duration of her stay and was threatening to leave A since arrival.  Has participated in therapies and made progress, did reach goals to facilitate safe  discharge home with ongoing in-home therapy services to further the recovery.  Has used dilaudid twice during TCU stay, primarily managing with acetaminophen and her baseline suboxone.    Plan:   -Follow-up with [facility] in 2 weeks for repeat x-rays wrist and hip (patient/family still need to schedule)  -Continue pain management w/ acetaminophen, PRN dilaudid (no further refills anticipated as being needed)  -WBAT to LLE, NWB to LUE  -Should see PCP or  [facility] upon [facility]CU discharge for initiation of bisphosphonate for her osteoporosis   - PT/OT upon discharge    (J21.0) RSV (acute bronchiolitis due to respiratory syncytial virus)  Diagnosed on admission to hospital 2/1. RSV resolving, no symptoms presently.  O2 sats >93% on RA since admission to TCU.  History of chronic cough, although she denies issues presently.  Remains on isolation precautions in TCU with anticipated end date of 2/13.     (R41.89) Cognitive impairment  SLUMS scoring at TCU 20/30 indicating cognitive impairment.  Some concerns regarding cognition noted while at hospital but no testing was done and there was documentation that thought it may be related to hearing loss.  Some confusion also noted prior to hospitalization per family.  Plan:  -HH OT to follow and do more cognitive testing in her familiar home setting    (M54.50,  G89.29) Chronic low back pain, unspecified back pain laterality, unspecified whether sciatica present  No issues at TCU.  Follows with Dr. Andersen at [facility] for chronic continuous use of opioids.    Plan:   -Patient to f/up with Dr. Andersen ([facility]) upon discharge for ongoing refills of suboxone      (I10) Essential hypertension  A couple episodes of elevated blood pressures during TCU stay:  2/8 (08:34): 157/77  2/9 (09:34): 175/88  2/11 (07:53): 160/73  All other readings SBP <140.  Plan:   -Follow-up with PCP within 7-10 days post TCU discharge  -Continue current cardiac meds w/out change:  amlodipine 5mg daily, lisinopril 5mg daily, metoprolol 100mg daily    (F41.1) CRISTEL (generalized anxiety disorder)  (Z86.59) History of depression  (K59.01) Slow transit constipation  No issues during TCU stay.    Plan:   -Continue sertraline 200mg daily  -Continue trazadone 100mg at HS PRN insomnia      (G43.909) Migraine without status migrainosus, not intractable, unspecified migraine type  No issues during TCU stay.  -Continue PRN naratriptan 2.5mg, 1 tab at onset of headache  -Emgality injections every 28 days ongoing     Discharge Medications:  MED REC REQUIRED  Post Medication Reconciliation Status: discharge medications reconciled, continue medications without change       Current Outpatient Medications   Medication Sig Dispense Refill     acetaminophen (TYLENOL) 500 MG tablet Take 1,000 mg by mouth 3 times daily       amLODIPine (NORVASC) 5 MG tablet Take 5 mg by mouth daily       benzonatate (TESSALON) 100 MG capsule Take 100 mg by mouth 3 times daily as needed for cough       bisacodyl (DULCOLAX) 10 MG suppository Place 10 mg rectally daily as needed for constipation       buprenorphine-naloxone (SUBOXONE) 2-0.5 MG SUBL sublingual tablet Place 1 tablet under the tongue every morning And give 0.5 tablet sublingually in the evening for Pain       Calcium Carbonate Antacid (CALCIUM CARBONATE PO) Take by mouth daily Calcium + Vitamin D3 Oral Tablet 600-5 MG-MCG       estradiol (ESTRACE) 0.1 MG/GM vaginal cream Place vaginally daily       galcanezumab-gnlm (EMGALITY) 120 MG/ML injection Inject 120 mg Subcutaneous every 28 days       HYDROmorphone (DILAUDID) 2 MG tablet Take 1 mg by mouth every 3 hours as needed for severe pain       ipratropium (ATROVENT) 0.03 % nasal spray Spray 2 sprays into both nostrils 3 times daily       lisinopril (ZESTRIL) 5 MG tablet Take 5 mg by mouth daily       metoprolol succinate ER (TOPROL XL) 100 MG 24 hr tablet Take 100 mg by mouth daily       Multiple Vitamins-Minerals (SYSTANE  ICAPS AREDS2 PO) Take 1 tablet by mouth 2 times daily       naratriptan (AMERGE) 2.5 MG tablet Take 2.5 mg by mouth at onset of headache for migraine       ondansetron (ZOFRAN ODT) 4 MG ODT tab Take 4 mg by mouth 2 times daily       pantoprazole (PROTONIX) 40 MG EC tablet Take 40 mg by mouth 2 times daily       polyethylene glycol (MIRALAX) 17 g packet Take 17 g by mouth daily as needed for constipation       potassium chloride ER (K-TAB) 20 MEQ CR tablet Take 20 mEq by mouth 2 times daily       psyllium (METAMUCIL/KONSYL) 58.6 % powder Take by mouth daily       sertraline (ZOLOFT) 100 MG tablet Take 200 mg by mouth daily       traZODone (DESYREL) 50 MG tablet Take 100 mg by mouth nightly as needed for sleep       vitamin B6 (PYRIDOXINE) 100 MG tablet Take 100 mg by mouth daily          Controlled medications:   Medication: hydromorphone , 5 tabs given to patient at the time of discharge to take home       Past Medical History:   Past Medical History:   Diagnosis Date     Anxiety      Chronic back pain      H/O malignant neoplasm of breast      Hypertension      Insomnia      Multiple myeloma not having achieved remission (H)      Paraesophageal hernia      PTSD (post-traumatic stress disorder)      Pulmonary hypertension (H)      Physical Exam:   Vitals: /72   Pulse 77   Temp 97.3  F (36.3  C)   Resp 16   Wt 54.9 kg (121 lb 1.6 oz)   SpO2 97%   BMI: There is no height or weight on file to calculate BMI.  GENERAL APPEARANCE:  Alert, in no distress, cooperative  ENT:  Mouth and posterior oropharynx normal, moist mucous membranes, [place]  RESP:  respiratory effort and palpation of chest normal, lungs clear to auscultation   CV:  Palpation and auscultation of heart done , regular rate and rhythm, no murmur, rub, or gallop, no edema  ABDOMEN:  normal bowel sounds, soft, nontender, no hepatosplenomegaly or other masses  M/S:   LUE in short arm cast.  No edema visible to fingers/hand.  LLE weakness with  SLR  PSYCH:  Oriented x3    SNF labs: No labs done during TCU stay    DISCHARGE PLAN:  Follow up labs: No labs orders/due  Medical Follow Up (Appointments need to be scheduled)   Follow up with primary care provider in 1-2 weeks   Follow up with ortho ([facility]) in 2 weeks   Follow up with [facility]/PCP for osteoporosis tx  Discharge Services: Home Care:  Occupational Therapy and Physical Therapy  Discharge Instructions Verbalized to Patient at Discharge:   Weight bearing restrictions:  WBAT to L leg, NWB to L arm (OK for platform walker).     TOTAL DISCHARGE TIME:   Greater than 30 minutes  Electronically signed by:  BILLY Wang CNP     Home care Face to Face documentation done in [employer] attached to Home care orders for [facility].           Face to Face and Medical Necessity Statement for DME Provider visit    Demographic Information on German Sharpe:  Gender: female  : 1945  [address]  [facility] [address]  [phone] (home)     Medical Record: [id number]  Social Security Number: [id number]  Primary Care Provider: Dr. Coni Rodriguez  Insurance: Payor: [employer] / Plan: [employer] MEDICARE ADVANTAGE / Product Type: HMO /     HPI:   German Sharpe is a 79 year old  (1945), who is being seen today for a face to face provider visit at [facility]; medical necessity statement for DME included. This patient requires the following:  DME Ordered and Medical Necessity Statement    The patient has a mobility limitation of non-weight bearing left arm and recent fracture to left hip  that significantly impairs her ability to participate in one or more mobility-related activities of daily living in the home. The patient is able to safely use the walker and the functional mobility deficit can be sufficiently resolved with the use of a platform walker     Pt needing above DME with expected length of need of 99  years  due to medical necessity associated  with following diagnosis:  -Displaced fracture of greater trochanter of left femur  -Fracture of distal end of left radius      Orders:  1. Adult walker with wheels with LEFT platform attachment      ELECTRONICALLY SIGNED BY TETO CERTIFIED PROVIDER:  BILLY Wang CNP   NPI: [id number]  [facility]  [address] [place] [id number]           Sincerely,        BILLY Wang CNP

## 2024-02-12 NOTE — PROGRESS NOTES
[facility] GERIATRICS DISCHARGE SUMMARY  PATIENT'S NAME: German Sharpe  YOB: 1945  MEDICAL RECORD NUMBER:  [id number]  Place of Service where encounter took place:  [facility] (TCU) [[id number]]    PRIMARY CARE PROVIDER AND CLINIC RESPONSIBLE AFTER TRANSFER: Coni Rodriguez, [facility]  Physician    Non-G Provider     Transferring providers: BILLY Wang CNP, Dr. Mateo Virgen MD  Recent Hospitalization/ED:  [facility]  stay 2/1 to 2/7.  Date of SNF Admission: February 07, 2024  Date of SNF (anticipated) Discharge: February 13, 2024  Discharged to: previous assisted living  Cognitive Scores: SLUMS: 20/30  Physical Function: Ambulating 150 ft with platform walker  DME: DME F2F done today 2/12 for platform walker    CODE STATUS/ADVANCE DIRECTIVES DISCUSSION:  DNR/DNI  ALLERGIES: Blood-group specific substance and Nsaids    NURSING FACILITY COURSE   Medication Changes/Rationale:   Fluticasone discontinued- patient states she does not use at baseline  Diclofenac gel discontinued- patient states she does not use at baseline  Miralax changed from scheduled to PRN- patient without constipation, infrequent use of narcotics    Summary of nursing facility stay:   (S58.171D) Closed fracture of distal end of left radius with routine healing, unspecified fracture morphology, subsequent encounter  (primary encounter diagnosis)  (S72.059D) Closed displaced fracture of greater trochanter of left femur with routine healing, subsequent encounter  Primary reason for TCU admission.  Hospital felt she was not safe to return home without additional therapies.  Patient has not felt TCU was necessary for the duration of her stay and was threatening to leave [facility] since arrival.  Has participated in therapies and made progress, did reach goals to facilitate safe discharge home with ongoing in-home therapy services to further the recovery.  Has used  dilaudid twice during TCU stay, primarily managing with acetaminophen and her baseline suboxone.    Plan:   -Follow-up with [facility] in 2 weeks for repeat x-rays wrist and hip (patient/family still need to schedule)  -Continue pain management w/ acetaminophen, PRN dilaudid (no further refills anticipated as being needed)  -WBAT to LLE, NWB to LUE  -Should [facility] or  [facility] upon [facility] discharge for initiation of bisphosphonate for her osteoporosis   - PT/OT upon discharge    (J21.0) RSV (acute bronchiolitis due to respiratory syncytial virus)  Diagnosed on admission to hospital 2/1. RSV resolving, no symptoms presently.  O2 sats >93% on RA since admission to TCU.  History of chronic cough, although she denies issues presently.  Remains on isolation precautions in TCU with anticipated end date of 2/13.     (R41.89) Cognitive impairment  SLUMS scoring at TCU 20/30 indicating cognitive impairment.  Some concerns regarding cognition noted while at hospital but no testing was done and there was documentation that thought it may be related to hearing loss.  Some confusion also noted prior to hospitalization per family.  Plan:  -HH OT to follow and do more cognitive testing in her familiar home setting    (M54.50,  G89.29) Chronic low back pain, unspecified back pain laterality, unspecified whether sciatica present  No issues at TCU.  Follows with Dr. Andersen at [facility] for chronic continuous use of opioids.    Plan:   -Patient to f/up with Dr. Andersen ([facility]) upon discharge for ongoing refills of suboxone      (I10) Essential hypertension  A couple episodes of elevated blood pressures during TCU stay:  2/8 (08:34): 157/77  2/9 (09:34): 175/88  2/11 (07:53): 160/73  All other readings SBP <140.  Plan:   -Follow-up with PCP within 7-10 days post TCU discharge  -Continue current cardiac meds w/out change: amlodipine 5mg daily, lisinopril 5mg daily, metoprolol 100mg daily    (F41.1) CRISTEL (generalized  anxiety disorder)  (Z86.59) History of depression  (K59.01) Slow transit constipation  No issues during TCU stay.    Plan:   -Continue sertraline 200mg daily  -Continue trazadone 100mg at HS PRN insomnia      (G43.909) Migraine without status migrainosus, not intractable, unspecified migraine type  No issues during TCU stay.  -Continue PRN naratriptan 2.5mg, 1 tab at onset of headache  -Emgality injections every 28 days ongoing     Discharge Medications:  MED REC REQUIRED  Post Medication Reconciliation Status: discharge medications reconciled, continue medications without change       Current Outpatient Medications   Medication Sig Dispense Refill    acetaminophen (TYLENOL) 500 MG tablet Take 1,000 mg by mouth 3 times daily      amLODIPine (NORVASC) 5 MG tablet Take 5 mg by mouth daily      benzonatate (TESSALON) 100 MG capsule Take 100 mg by mouth 3 times daily as needed for cough      bisacodyl (DULCOLAX) 10 MG suppository Place 10 mg rectally daily as needed for constipation      buprenorphine-naloxone (SUBOXONE) 2-0.5 MG SUBL sublingual tablet Place 1 tablet under the tongue every morning And give 0.5 tablet sublingually in the evening for Pain      Calcium Carbonate Antacid (CALCIUM CARBONATE PO) Take by mouth daily Calcium + Vitamin D3 Oral Tablet 600-5 MG-MCG      estradiol (ESTRACE) 0.1 MG/GM vaginal cream Place vaginally daily      galcanezumab-gnlm (EMGALITY) 120 MG/ML injection Inject 120 mg Subcutaneous every 28 days      HYDROmorphone (DILAUDID) 2 MG tablet Take 1 mg by mouth every 3 hours as needed for severe pain      ipratropium (ATROVENT) 0.03 % nasal spray Spray 2 sprays into both nostrils 3 times daily      lisinopril (ZESTRIL) 5 MG tablet Take 5 mg by mouth daily      metoprolol succinate ER (TOPROL XL) 100 MG 24 hr tablet Take 100 mg by mouth daily      Multiple Vitamins-Minerals (SYSTANE ICAPS AREDS2 PO) Take 1 tablet by mouth 2 times daily      naratriptan (AMERGE) 2.5 MG tablet Take 2.5 mg  by mouth at onset of headache for migraine      ondansetron (ZOFRAN ODT) 4 MG ODT tab Take 4 mg by mouth 2 times daily      pantoprazole (PROTONIX) 40 MG EC tablet Take 40 mg by mouth 2 times daily      polyethylene glycol (MIRALAX) 17 g packet Take 17 g by mouth daily as needed for constipation      potassium chloride ER (K-TAB) 20 MEQ CR tablet Take 20 mEq by mouth 2 times daily      psyllium (METAMUCIL/KONSYL) 58.6 % powder Take by mouth daily      sertraline (ZOLOFT) 100 MG tablet Take 200 mg by mouth daily      traZODone (DESYREL) 50 MG tablet Take 100 mg by mouth nightly as needed for sleep      vitamin B6 (PYRIDOXINE) 100 MG tablet Take 100 mg by mouth daily          Controlled medications:   Medication: hydromorphone , 5 tabs given to patient at the time of discharge to take home       Past Medical History:   Past Medical History:   Diagnosis Date    Anxiety     Chronic back pain     H/O malignant neoplasm of breast     Hypertension     Insomnia     Multiple myeloma not having achieved remission (H)     Paraesophageal hernia     PTSD (post-traumatic stress disorder)     Pulmonary hypertension (H)      Physical Exam:   Vitals: /72   Pulse 77   Temp 97.3  F (36.3  C)   Resp 16   Wt 54.9 kg (121 lb 1.6 oz)   SpO2 97%   BMI: There is no height or weight on file to calculate BMI.  GENERAL APPEARANCE:  Alert, in no distress, cooperative  ENT:  Mouth and posterior oropharynx normal, moist mucous membranes, [place]  RESP:  respiratory effort and palpation of chest normal, lungs clear to auscultation   CV:  Palpation and auscultation of heart done , regular rate and rhythm, no murmur, rub, or gallop, no edema  ABDOMEN:  normal bowel sounds, soft, nontender, no hepatosplenomegaly or other masses  M/S:   LUE in short arm cast.  No edema visible to fingers/hand.  LLE weakness with SLR  PSYCH:  Oriented x3    SNF labs: No labs done during TCU stay    DISCHARGE PLAN:  Follow up labs: No labs orders/due  Medical  Follow Up (Appointments need to be scheduled)   Follow up with primary care provider in 1-2 weeks   Follow up with ortho ([facility]) in 2 weeks   Follow up with [facility] at [facility]/PCP for osteoporosis tx  Discharge Services: Home Care:  Occupational Therapy and Physical Therapy  Discharge Instructions Verbalized to Patient at Discharge:   Weight bearing restrictions:  WBAT to L leg, NWB to L arm (OK for platform walker).     TOTAL DISCHARGE TIME:   Greater than 30 minutes  Electronically signed by:  BILLY Wang CNP     Home care Face to Face documentation done in [facility] attached to Home care orders for [facility].           Face to Face and Medical Necessity Statement for DME Provider visit    Demographic Information on German Sharpe:  Gender: female  : 1945  [address]  [facility] [address]  [phone] (home)     Medical Record: [id number]  Social Security Number: [id number]  Primary Care Provider: Dr. Coni Rodriguez  Insurance: Payor: [employer] / Plan: [employer] MEDICARE ADVANTAGE / Product Type: HMO /     HPI:   German Sharpe is a 79 year old  (1945), who is being seen today for a face to face provider visit at [facility]; medical necessity statement for DME included. This patient requires the following:  DME Ordered and Medical Necessity Statement    The patient has a mobility limitation of non-weight bearing left arm and recent fracture to left hip  that significantly impairs her ability to participate in one or more mobility-related activities of daily living in the home. The patient is able to safely use the walker and the functional mobility deficit can be sufficiently resolved with the use of a platform walker     Pt needing above DME with expected length of need of 99  years  due to medical necessity associated with following diagnosis:  -Displaced fracture of greater trochanter of left femur  -Fracture of distal end of left  radius      Orders:  1. Adult walker with wheels with LEFT platform attachment      ELECTRONICALLY SIGNED BY TETO CERTIFIED PROVIDER:  BILLY Wang CNP   NPI: [id number]  [facility]  [address] [place] [id number]

## 2024-03-04 ENCOUNTER — DOCUMENTATION ONLY (OUTPATIENT)
Dept: OTHER | Facility: CLINIC | Age: 79
End: 2024-03-04
Payer: COMMERCIAL

## 2024-11-12 ENCOUNTER — APPOINTMENT (OUTPATIENT)
Dept: URBAN - METROPOLITAN AREA CLINIC 255 | Age: 79
Setting detail: DERMATOLOGY
End: 2024-11-19

## 2024-11-12 PROBLEM — C44.01 BASAL CELL CARCINOMA OF SKIN OF LIP: Status: ACTIVE | Noted: 2024-11-12

## 2024-11-12 PROBLEM — C44.219 BASAL CELL CARCINOMA OF SKIN OF LEFT EAR AND EXTERNAL AURICULAR CANAL: Status: ACTIVE | Noted: 2024-11-12

## 2024-11-12 PROCEDURE — OTHER MOHS SURGERY: OTHER

## 2024-11-12 PROCEDURE — OTHER MIPS QUALITY: OTHER

## 2024-11-12 PROCEDURE — 13151 CMPLX RPR E/N/E/L 1.1-2.5 CM: CPT | Mod: 59

## 2024-11-12 PROCEDURE — 17311 MOHS 1 STAGE H/N/HF/G: CPT

## 2024-11-12 PROCEDURE — 14060 TIS TRNFR E/N/E/L 10 SQ CM/<: CPT

## 2024-11-12 PROCEDURE — 17311 MOHS 1 STAGE H/N/HF/G: CPT | Mod: 76

## 2024-11-12 NOTE — PROCEDURE: MIPS QUALITY
Quality 130: Documentation Of Current Medications In The Medical Record: Current Medications Documented
Detail Level: Detailed
Quality 431: Preventive Care And Screening: Unhealthy Alcohol Use - Screening: Patient not identified as an unhealthy alcohol user when screened for unhealthy alcohol use using a systematic screening method
Quality 143: Oncology: Medical And Radiation- Pain Intensity Quantified: Pain severity quantified, no pain present
Quality 226: Preventive Care And Screening: Tobacco Use: Screening And Cessation Intervention: Patient screened for tobacco use and is an ex/non-smoker

## 2024-11-12 NOTE — PROCEDURE: MOHS SURGERY
Show Home Suture Removal Variable: Yes
Mohs Histo Method Verbiage: Each section was then chromacoded and processed in the Mohs lab using the Mohs protocol and submitted for frozen section, utilizing hematoxylin and eosin histochemical stains.
Length To Time In Minutes Device Was In Place: 10
Subsequent Stages Histo Method Verbiage: Using a similar technique to that described above, a thin layer of tissue was removed from all areas where tumor was visible on the previous stage.  The tissue was again oriented, mapped, dyed, and processed as above.
Stage 6: Additional Anesthesia Type: 1% lidocaine with epinephrine
Nasal Turnover Hinge Flap Text: The defect edges were debeveled with a #15 scalpel blade.  Given the size, depth, location of the defect and the defect being full thickness a nasal turnover hinge flap was deemed most appropriate. Using a sterile surgical marker, an appropriate hinge flap was drawn incorporating the defect. The area thus outlined was incised with a #15 scalpel blade. The flap was designed to recreate the nasal mucosal lining and the alar rim. The skin margins were undermined to an appropriate distance in all directions utilizing iris scissors. Following this, the designed flap was carried over into the primary defect and sutured into place
Alternatives Discussed Intro (Do Not Add Period): I discussed alternative treatments to Mohs surgery and specifically discussed the risks and benefits of
Island Pedicle Flap-Requiring Vessel Identification Text: The defect edges were debeveled with a #15 scalpel blade. Given the location of the defect, shape of the defect and the proximity to free margins an island pedicle advancement flap was deemed most appropriate. Using a sterile surgical marker, an appropriate advancement flap was drawn, based on the axial vessel mentioned above, incorporating the defect, outlining the appropriate donor tissue and placing the expected incisions within the relaxed skin tension lines where possible. The area thus outlined was incised deep to adipose tissue with a #15 scalpel blade. The skin margins were undermined to an appropriate distance in all directions around the primary defect and laterally outward around the island pedicle utilizing iris scissors.  There was minimal undermining beneath the pedicle flap. Following this, the designed flap was carried over into the primary defect and sutured into place.
Peng Advancement Flap Text: The defect edges were debeveled with a #15 scalpel blade. Given the location of the defect, shape of the defect and the proximity to free margins a Peng advancement flap was deemed most appropriate. Using a sterile surgical marker, an appropriate advancement flap was drawn incorporating the defect and placing the expected incisions within the relaxed skin tension lines where possible. The area thus outlined was incised deep to adipose tissue with a #15 scalpel blade. The skin margins were undermined to an appropriate distance in all directions utilizing iris scissors. Following this, the designed flap was advanced and carried over into the primary defect and sutured into place.
Oculoplastic Surgeon Procedure Text (A): After obtaining clear surgical margins the patient was sent to oculoplastics for surgical repair.  The patient understands they will receive post-surgical care and follow-up from the referring physician's office.
Cheek-To-Nose Interpolation Flap Text: A decision was made to reconstruct the defect utilizing an interpolation axial flap and a staged reconstruction.  A telfa template was made of the defect.  This telfa template was then used to outline the Cheek-To-Nose Interpolation flap.  The donor area for the pedicle flap was then injected with anesthesia.  The flap was excised through the skin and subcutaneous tissue down to the layer of the underlying musculature.  The interpolation flap was carefully excised within this deep plane to maintain its blood supply.  The edges of the donor site were undermined.   The donor site was closed in a primary fashion.  The pedicle was then rotated into position and sutured.  Once the tube was sutured into place, adequate blood supply was confirmed with blanching and refill.  The pedicle was then wrapped with xeroform gauze and dressed appropriately with a telfa and gauze bandage to ensure continued blood supply and protect the attached pedicle.
Additional Defect Depth In Cm (Optional But Required For Some Insurers): 0
X Size Of Lesion In Cm (Optional): 1
Eyelid Full Thickness Repair - 82932: The eyelid defect was full thickness which required a wedge repair of the eyelid. Special care was taken to ensure that the eyelid margin was realligned when placing sutures.
Consent (Near Eyelid Margin)/Introductory Paragraph: The rationale for Mohs was explained to the patient and consent was obtained. The risks, benefits and alternatives to therapy were discussed in detail. Specifically, the risks of ectropion or eyelid deformity, infection, scarring, bleeding, prolonged wound healing, incomplete removal, allergy to anesthesia, nerve injury and recurrence were addressed. Prior to the procedure, the treatment site was clearly identified and confirmed by the patient. All components of Universal Protocol/PAUSE Rule completed.
Tarsorrhaphy Text: A tarsorrhaphy was performed using Frost sutures.
Complex Requirements: Retention Sutures?: No
Special Stains Stage 10 - Results: Base On Clearance Noted Above
Cartilage Graft Text: The defect edges were debeveled with a #15 scalpel blade. Given the location of the defect, shape of the defect, the fact the defect involved a full thickness cartilage defect a cartilage graft was deemed most appropriate.  An appropriate donor site was identified, cleansed, and anesthetized. The cartilage graft was then harvested and transferred to the recipient site, oriented appropriately and then sutured into place.  The secondary defect was then repaired using a primary closure.
Inflammation Suggestive Of Cancer Camouflage Histology Text: There was a dense lymphocytic infiltrate which prevented adequate histologic evaluation of adjacent structures.
Why Was The Change Made?: Please Select the Appropriate Response
Manual Repair Warning Statement: We plan on removing the manually selected variable below in favor of our much easier automatic structured text blocks found in the previous tab. We decided to do this to help make the flow better and give you the full power of structured data. Manual selection is never going to be ideal in our platform and I would encourage you to avoid using manual selection from this point on, especially since I will be sunsetting this feature. It is important that you do one of two things with the customized text below. First, you can save all of the text in a word file so you can have it for future reference. Second, transfer the text to the appropriate area in the Library tab. Lastly, if there is a flap or graft type which we do not have you need to let us know right away so I can add it in before the variable is hidden. No need to panic, we plan to give you roughly 6 months to make the change.
Detail Level: Detailed
Consent 1/Introductory Paragraph: The rationale for Mohs was explained to the patient and consent was obtained. The risks, benefits and alternatives to therapy were discussed in detail. Specifically, the risks of infection, scarring, bleeding, prolonged wound healing, incomplete removal, allergy to anesthesia, nerve injury and recurrence were addressed. Prior to the procedure, the treatment site was clearly identified and confirmed by the patient. All components of Universal Protocol/PAUSE Rule completed.
Helical Rim Text: The closure involved the helical rim.
Where Do You Want The Question To Include Opioid Counseling Located?: Case Summary Tab
Consent 3/Introductory Paragraph: I gave the patient a chance to ask questions they had about the procedure.  Following this I explained the Mohs procedure and consent was obtained. The risks, benefits and alternatives to therapy were discussed in detail. Specifically, the risks of infection, scarring, bleeding, prolonged wound healing, incomplete removal, allergy to anesthesia, nerve injury and recurrence were addressed. Prior to the procedure, the treatment site was clearly identified and confirmed by the patient. All components of Universal Protocol/PAUSE Rule completed.
Home Suture Removal Text: Patient was provided instructions on removing sutures and will remove their sutures at home.  If they have any questions or difficulties they will call the office.
Mid-Level Procedure Text (C): After obtaining clear surgical margins the patient was sent to a mid-level provider for surgical repair.  The patient understands they will receive post-surgical care and follow-up from the mid-level provider.
Eye Protection Verbiage: Before proceeding with the stage, a plastic scleral shield was inserted. The globe was anesthetized with a few drops of 1% lidocaine with 1:100,000 epinephrine. Then, an appropriate sized scleral shield was chosen and coated with lacrilube ointment. The shield was gently inserted and left in place for the duration of each stage. After the stage was completed, the shield was gently removed.
M-Plasty Complex Repair Preamble Text (Leave Blank If You Do Not Want): Extensive wide undermining was performed.
Burow's Advancement Flap Text: The defect edges were debeveled with a #15 scalpel blade. Given the location of the defect and the proximity to free margins a Burow's advancement flap was deemed most appropriate. Using a sterile surgical marker, the appropriate advancement flap was drawn incorporating the defect and placing the expected incisions within the relaxed skin tension lines where possible. The area thus outlined was incised deep to adipose tissue with a #15 scalpel blade. The skin margins were undermined to an appropriate distance in all directions utilizing iris scissors. Following this, the designed flap was advanced and carried over into the primary defect and sutured into place.
Lazy S Intermediate Repair Preamble Text (Leave Blank If You Do Not Want): Undermining was performed with blunt dissection.
Provider Procedure Text (E): After obtaining clear surgical margins the defect was repaired by another provider.
Asc Procedure Text (A): After obtaining clear surgical margins the patient was sent to an ASC for surgical repair.  The patient understands they will receive post-surgical care and follow-up from the ASC physician.
Composite Graft Text: The defect edges were debeveled with a #15 scalpel blade. Given the location of the defect, shape of the defect, the proximity to free margins and the fact the defect was full thickness a composite graft was deemed most appropriate.  The defect was outline and then transferred to the donor site.  A full thickness graft was then excised from the donor site. The graft was then placed in the primary defect, oriented appropriately and then sutured into place.  The secondary defect was then repaired using a primary closure.
Estlander Flap (Lower To Upper Lip) Text: The defect of the lower lip was assessed and measured.  Given the location and size of the defect, an Estlander flap was deemed most appropriate. Using a sterile surgical marker, an appropriate Estlander flap was measured and drawn on the upper lip. Local anesthesia was then infiltrated. A scalpel was then used to incise the lateral aspect of the flap, through skin, muscle and mucosa, leaving the flap pedicled medially.  The flap was then rotated and positioned to fill the lower lip defect.  The flap was then sutured into place with a three layer technique, closing the orbicularis oris muscle layer with subcutaneous buried sutures, followed by a mucosal layer and an epidermal layer.
Posterior Auricular Interpolation Flap Text: A decision was made to reconstruct the defect utilizing an interpolation axial flap and a staged reconstruction.  A telfa template was made of the defect.  This telfa template was then used to outline the posterior auricular interpolation flap.  The donor area for the pedicle flap was then injected with anesthesia.  The flap was excised through the skin and subcutaneous tissue down to the layer of the underlying musculature.  The pedicle flap was carefully excised within this deep plane to maintain its blood supply.  The edges of the donor site were undermined.   The donor site was closed in a primary fashion.  The pedicle was then rotated into position and sutured.  Once the tube was sutured into place, adequate blood supply was confirmed with blanching and refill.  The pedicle was then wrapped with xeroform gauze and dressed appropriately with a telfa and gauze bandage to ensure continued blood supply and protect the attached pedicle.
Muscle Hinge Flap Text: The defect edges were debeveled with a #15 scalpel blade.  Given the size, depth and location of the defect and the proximity to free margins a muscle hinge flap was deemed most appropriate. Using a sterile surgical marker, an appropriate hinge flap was drawn incorporating the defect. The area thus outlined was incised with a #15 scalpel blade. The skin margins were undermined to an appropriate distance in all directions utilizing iris scissors. Following this, the designed flap was carried into the primary defect and sutured into place.
Cheiloplasty (Less Than 50%) Text: A decision was made to reconstruct the defect with a  cheiloplasty.  The defect was undermined extensively.  Additional orbicularis oris muscle was excised with a 15 blade scalpel.  The defect was converted into a full thickness wedge, of less than 50% of the vertical height of the lip, to facilite a better cosmetic result.  Small vessels were then tied off with 5-0 monocyrl. The orbicularis oris, superficial fascia, adipose and dermis were then reapproximated.  After the deeper layers were approximated the epidermis was reapproximated with particular care given to realign the vermilion border.
Primary Defect Length In Cm (Final Defect Size - Required For Flaps/Grafts): 1.6
Ear Wedge Repair Text: A wedge excision was completed by carrying down an excision through the full thickness of the ear and cartilage with an inward facing Burow's triangle. The wound was then closed in a layered fashion.
Flip-Flop Flap Text: The defect edges were debeveled with a #15 blade scalpel.  Given the location of the defect and the proximity to free margins a flip-flop flap was deemed most appropriate. Using a sterile surgical marker, the appropriate flap was drawn incorporating the defect and placing the expected incisions between the helical rim and antihelix where possible.  The area thus outlined was incised through and through with a #15 scalpel blade. Following this, the designed flap was carried over into the primary defect and sutured into place.
Plastic Surgeon Procedure Text (F): After obtaining clear surgical margins the patient was sent to plastics for surgical repair.  The patient understands they will receive post-surgical care and follow-up from the referring physician's office.
Hatchet Flap Text: The defect edges were debeveled with a #15 scalpel blade. Given the location of the defect, shape of the defect and the proximity to free margins a hatchet flap was deemed most appropriate. Using a sterile surgical marker, an appropriate hatchet flap was drawn incorporating the defect and placing the expected incisions within the relaxed skin tension lines where possible. The area thus outlined was incised deep to adipose tissue with a #15 scalpel blade. The skin margins were undermined to an appropriate distance in all directions utilizing iris scissors. Following this, the designed flap was carried over into the primary defect and sutured into place.
Rhomboid Transposition Flap Text: The defect edges were debeveled with a #15 scalpel blade. Given the location of the defect and the proximity to free margins a rhomboid transposition flap was deemed most appropriate. Using a sterile surgical marker, an appropriate rhomboid flap was drawn incorporating the defect. The area thus outlined was incised deep to adipose tissue with a #15 scalpel blade. The skin margins were undermined to an appropriate distance in all directions utilizing iris scissors. Following this, the designed flap was carried over into the primary defect and sutured into place.
Xenograft Text: The defect edges were debeveled with a #15 scalpel blade. Given the location of the defect, shape of the defect and the proximity to free margins a xenograft was deemed most appropriate.  The graft was then trimmed to fit the size of the defect.  The graft was then placed in the primary defect and oriented appropriately.
Anesthesia Volume In Cc: 6
Which Eyelid Repair Cpt Are You Using?: 74704
Rectangular Flap Text: The defect edges were debeveled with a #15 scalpel blade. Given the location of the defect and the proximity to free margins a rectangular flap was deemed most appropriate. Using a sterile surgical marker, an appropriate rectangular flap was drawn incorporating the defect. The area thus outlined was incised deep to adipose tissue with a #15 scalpel blade. The skin margins were undermined to an appropriate distance in all directions utilizing iris scissors. Following this, the designed flap was carried over into the primary defect and sutured into place.
Cheiloplasty (Complex) Text: A decision was made to reconstruct the defect with a  cheiloplasty.  The defect was undermined extensively.  Additional orbicularis oris muscle was excised with a 15 blade scalpel.  The defect was converted into a full thickness wedge to facilite a better cosmetic result.  Small vessels were then tied off with 5-0 monocyrl. The orbicularis oris, superficial fascia, adipose and dermis were then reapproximated.  After the deeper layers were approximated the epidermis was reapproximated with particular care given to realign the vermilion border.
Dorsal Nasal Flap Text: The defect edges were debeveled with a #15 scalpel blade. Given the location of the defect and the proximity to free margins a dorsal nasal flap was deemed most appropriate. Using a sterile surgical marker, an appropriate dorsal nasal flap was drawn around the defect. The area thus outlined was incised deep to adipose tissue with a #15 scalpel blade. The skin margins were undermined to an appropriate distance in all directions utilizing iris scissors. Following this, the designed flap was carried into the primary defect and sutured into place.
Surgeon: Ghada Luis MPhil, MD
Intermediate Repair And Graft Additional Text (Will Appearing After The Standard Complex Repair Text): The intermediate repair was not sufficient to completely close the primary defect. The remaining additional defect was repaired with the graft mentioned below.
V-Y Plasty Text: The defect edges were debeveled with a #15 scalpel blade. Given the location of the defect, shape of the defect and the proximity to free margins an V-Y advancement flap was deemed most appropriate. Using a sterile surgical marker, an appropriate advancement flap was drawn incorporating the defect and placing the expected incisions within the relaxed skin tension lines where possible. The area thus outlined was incised deep to adipose tissue with a #15 scalpel blade. The skin margins were undermined to an appropriate distance in all directions utilizing iris scissors. Following this, the designed flap was advanced and carried over into the primary defect and sutured into place.
Abbe Flap (Upper To Lower Lip) Text: The defect of the lower lip was assessed and measured.  Given the location and size of the defect, an Abbe flap was deemed most appropriate. Using a sterile surgical marker, an appropriate Abbe flap was measured and drawn on the upper lip. Local anesthesia was then infiltrated.  A scalpel was then used to incise the upper lip through and through the skin, vermilion, muscle and mucosa, leaving the flap pedicled on the opposite side.  The flap was then rotated and transferred to the lower lip defect.  The flap was then sutured into place with a three layer technique, closing the orbicularis oris muscle layer with subcutaneous buried sutures, followed by a mucosal layer and an epidermal layer.
Otolaryngologist Procedure Text (A): After obtaining clear surgical margins the patient was sent to otolaryngology for surgical repair.  The patient understands they will receive post-surgical care and follow-up from the referring physician's office.
Advancement Flap (Double) Text: In order to preserve normal anatomic and functional relationships, a double advancement flap was deemed the most appropriate reconstructive procedure.  The beveled edges of the Mohs defect were excised with a #15 scalpel blade.    The flaps were designed to fall along relaxed skin tension lines and/or free margins, incised, and elevated using blunt curved tissue scissors.  Hemostasis was achieved.  Interrupted buried vertical mattress sutures were employed to carry over and secure the flaps in place.  Redundant cutaneous columns defined by the flaps' movements were removed to the adipose layer using the triangulation technique and repaired in similar fashion.  Final epidermal approximation along the length of the flap was achieved using epidermal sutures.  The wound was stressed in various directions to ensure the adequacy of the closure and of hemostasis.  The flap edges appeared pink and well perfused.  Reconstruction was considered complete.
Bilateral Helical Rim Advancement Flap Text: The defect edges were debeveled with a #15 blade scalpel.  Given the location of the defect and the proximity to free margins (helical rim) a bilateral helical rim advancement flap was deemed most appropriate. Using a sterile surgical marker, the appropriate advancement flaps were drawn incorporating the defect and placing the expected incisions between the helical rim and antihelix where possible.  The area thus outlined was incised through and through with a #15 scalpel blade.  With a skin hook and iris scissors, the flaps were gently and sharply undermined and freed up. Following this, the designed flaps were placed into the primary defect and sutured into place.
Closure 3 Information: This tab is for additional flaps and grafts above and beyond our usual structured repairs.  Please note if you enter information here it will not currently bill and you will need to add the billing information manually.
Pinch Graft Text: The defect edges were debeveled with a #15 scalpel blade. Given the location of the defect, shape of the defect and the proximity to free margins a pinch graft was deemed most appropriate. Using a sterile surgical marker, the primary defect shape was transferred to the donor site. The area thus outlined was incised deep to adipose tissue with a #15 scalpel blade.  The harvested graft was then trimmed of adipose tissue until only dermis and epidermis was left. The skin margins of the secondary defect were undermined to an appropriate distance in all directions utilizing iris scissors.  The secondary defect was closed with interrupted buried subcutaneous sutures.  The skin edges were then re-apposed with running  sutures.  The skin graft was then placed in the primary defect and oriented appropriately.
Star Wedge Flap Text: The defect edges were debeveled with a #15 scalpel blade. Given the location of the defect, shape of the defect and the proximity to free margins a star wedge flap was deemed most appropriate. Using a sterile surgical marker, an appropriate rotation flap was drawn incorporating the defect and placing the expected incisions within the relaxed skin tension lines where possible. The area thus outlined was incised deep to adipose tissue with a #15 scalpel blade. The skin margins were undermined to an appropriate distance in all directions utilizing iris scissors. Following this, the designed flap was carried over into the primary defect and sutured into place.
Anesthesia Volume In Cc: 4
No Repair - Repaired With Adjacent Surgical Defect Text (Leave Blank If You Do Not Want): After obtaining clear surgical margins the defect was repaired concurrently with another surgical defect which was in close approximation.
Island Pedicle Flap Text: The defect edges were debeveled with a #15 scalpel blade. Given the location, size, and shape of the defect, an island pedicle advancement flap was deemed the most appropriate reconstructive option. An appropriate advancement flap was created incorporating the defect, outlining the appropriate donor tissue and placing the expected incisions within the relaxed skin tension lines where possible. The area thus outlined was incised deep to adipose tissue with a #15 scalpel blade. The skin margins were undermined to an appropriate distance in all directions around the primary defect and laterally outward around the island pedicle utilizing Ragnell scissors.  An appropriate pedicle of adipose and muscular tissue was created toward the advancing flap edge. The flap was carried over into the primary defect and sutured into place, with the secondary defect closed in V-Y fashion.  The flap was then stressed in various directions to assess the adequacy of closure and of hemostasis.  The flap was pink and well perfused.  Reconstruction was considered complete.
Bilobed Transposition Flap Text: The defect edges were debeveled with a #15 scalpel blade. Given the location of the defect and the proximity to free margins a bilobed transposition flap was deemed most appropriate. Using a sterile surgical marker, an appropriate bilobe flap drawn around the defect. The area thus outlined was incised deep to adipose tissue with a #15 scalpel blade. The skin margins were undermined to an appropriate distance in all directions utilizing iris scissors. Following this, the designed flap was carried over into the primary defect and sutured into place.
Paramedian Forehead Flap Text: A decision was made to reconstruct the defect utilizing an interpolation axial flap and a staged reconstruction.  A telfa template was made of the defect.  This telfa template was then used to outline the paramedian forehead pedicle flap.  The donor area for the pedicle flap was then injected with anesthesia.  The flap was excised through the skin and subcutaneous tissue down to the layer of the underlying musculature.  The pedicle flap was carefully excised within this deep plane to maintain its blood supply.  The edges of the donor site were undermined.   The donor site was closed in a primary fashion.  The pedicle was then rotated into position and sutured.  Once the tube was sutured into place, adequate blood supply was confirmed with blanching and refill.  The pedicle was then wrapped with xeroform gauze and dressed appropriately with a telfa and gauze bandage to ensure continued blood supply and protect the attached pedicle.
Mucosal Advancement Flap Text: Given the location of the defect, shape of the defect and the proximity to free margins a mucosal advancement flap was deemed most appropriate. Incisions were made with a 15 blade scalpel in the appropriate fashion along the cutaneous vermilion border and the mucosal lip. The remaining actinically damaged mucosal tissue was excised.  The mucosal advancement flap was then elevated to the gingival sulcus with care taken to preserve the neurovascular structures and advanced into the primary defect. Care was taken to ensure that precise realignment of the vermilion border was achieved.
Wound Care (No Sutures): Petrolatum
Zygomaticofacial Flap Text: Given the location of the defect, shape of the defect and the proximity to free margins a zygomaticofacial flap was deemed most appropriate for repair. Using a sterile surgical marker, the appropriate flap was drawn incorporating the defect and placing the expected incisions within the relaxed skin tension lines where possible. The area thus outlined was incised deep to adipose tissue with a #15 scalpel blade with preservation of a vascular pedicle.  The skin margins were undermined to an appropriate distance in all directions utilizing iris scissors. The flap was then carried over into the defect and anchored with interrupted buried subcutaneous sutures.
Melolabial Interpolation Flap Text: A decision was made to reconstruct the defect utilizing an interpolation axial flap and a staged reconstruction.  A telfa template was made of the defect.  This telfa template was then used to outline the melolabial interpolation flap.  The donor area for the pedicle flap was then injected with anesthesia.  The flap was excised through the skin and subcutaneous tissue down to the layer of the underlying musculature.  The pedicle flap was carefully excised within this deep plane to maintain its blood supply.  The edges of the donor site were undermined.   The donor site was closed in a primary fashion.  The pedicle was then rotated into position and sutured.  Once the tube was sutured into place, adequate blood supply was confirmed with blanching and refill.  The pedicle was then wrapped with xeroform gauze and dressed appropriately with a telfa and gauze bandage to ensure continued blood supply and protect the attached pedicle.
Double Z Plasty Text: The lesion was extirpated to the level of the fat with a #15 scalpel blade. Given the location of the defect, shape of the defect and the proximity to free margins a double Z-plasty was deemed most appropriate for repair. Using a sterile surgical marker, the appropriate transposition arms of the double Z-plasty were drawn incorporating the defect and placing the expected incisions within the relaxed skin tension lines where possible. The area thus outlined was incised deep to adipose tissue with a #15 scalpel blade. The skin margins were undermined to an appropriate distance in all directions utilizing iris scissors. The opposing transposition arms were then transposed and carried over into place in opposite direction and anchored with interrupted buried subcutaneous sutures.
Consent (Scalp)/Introductory Paragraph: The rationale for Mohs was explained to the patient and consent was obtained. The risks, benefits and alternatives to therapy were discussed in detail. Specifically, the risks of changes in hair growth pattern secondary to repair, infection, scarring, bleeding, prolonged wound healing, incomplete removal, allergy to anesthesia, nerve injury and recurrence were addressed. Prior to the procedure, the treatment site was clearly identified and confirmed by the patient. All components of Universal Protocol/PAUSE Rule completed.
Localized Dermabrasion With Wire Brush Text: The patient was draped in routine manner.  Localized dermabrasion using 3 x 17 mm wire brush was performed in routine manner to papillary dermis. This spot dermabrasion is being performed to complete skin cancer reconstruction. It also will eliminate the other sun damaged precancerous cells that are known to be part of the regional effect of a lifetime's worth of sun exposure. This localized dermabrasion is therapeutic and should not be considered cosmetic in any regard.
Initial Size Of Lesion: 1.2
Simple / Intermediate / Complex Repair - Final Wound Length In Cm: 2.2
Chonodrocutaneous Helical Advancement Flap Text: The defect edges were debeveled with a #15 scalpel blade. Given the location of the defect and the proximity to free margins a chondrocutaneous helical advancement flap was deemed most appropriate. Using a sterile surgical marker, the appropriate advancement flap was drawn incorporating the defect and placing the expected incisions within the relaxed skin tension lines where possible. The area thus outlined was incised deep to adipose tissue with a #15 scalpel blade. The skin margins were undermined to an appropriate distance in all directions utilizing iris scissors. Following this, the designed flap was advanced and carried over into the primary defect and sutured into place.
O-L Flap Text: The defect edges were debeveled with a #15 scalpel blade. Given the location of the defect, shape of the defect and the proximity to free margins an O-L flap was deemed most appropriate. Using a sterile surgical marker, an appropriate advancement flap was drawn incorporating the defect and placing the expected incisions within the relaxed skin tension lines where possible. The area thus outlined was incised deep to adipose tissue with a #15 scalpel blade. The skin margins were undermined to an appropriate distance in all directions utilizing iris scissors. Following this, the designed flap was advanced and carried over into the primary defect and sutured into place.
Mart-1 - Positive Histology Text: MART-1 staining demonstrates areas of higher density and clustering of melanocytes with Pagetoid spread upwards within the epidermis. The surgical margins are positive for tumor cells.
Postop Diagnosis: same
Rhombic Flap Text: In order to preserve normal anatomic and functional relationships, a rhombic flap was deemed the most appropriate reconstructive procedure.  The beveled edges of the Mohs defect were excised with a #15 scalpel blade.    The flap was designed to fall along relaxed skin tension lines and/or free margins, incised, and elevated using blunt curved tissue scissors.  Hemostasis was achieved.  Interrupted buried vertical mattress sutures were employed to carry over (transpose) and secure the flap in place.  Redundant cutaneous columns defined by the flap's movement were removed to the adipose layer using the triangulation technique and repaired in similar fashion.  Final epidermal approximation along the length of the flap was achieved using epidermal sutures.  The wound was stressed in various directions to ensure the adequacy of the closure and of hemostasis.  The flap edges appeared pink and well perfused.  Reconstruction was considered complete.
Donor Site Anesthesia Type: same as repair anesthesia
Non-Graft Cartilage Fenestration Text: The cartilage was fenestrated with a 2mm punch biopsy to help facilitate healing.
Deep Sutures: 5-0 PGLC
Double O-Z Plasty Text: The defect edges were debeveled with a #15 scalpel blade. Given the location of the defect, shape of the defect and the proximity to free margins a Double O-Z plasty (double transposition flap) was deemed most appropriate. Using a sterile surgical marker, the appropriate transposition flaps were drawn incorporating the defect and placing the expected incisions within the relaxed skin tension lines where possible. The area thus outlined was incised deep to adipose tissue with a #15 scalpel blade. The skin margins were undermined to an appropriate distance in all directions utilizing iris scissors. Hemostasis was achieved with electrocautery. The flaps were then transposed and carried over into place, one clockwise and the other counterclockwise, and anchored with interrupted buried subcutaneous sutures.
Partial Purse String (Simple) Text: Given the location of the defect and the characteristics of the surrounding skin a simple purse string closure was deemed most appropriate.  Undermining was performed circumferentially around the surgical defect.  A purse string suture was then placed and tightened. Wound tension only allowed a partial closure of the circular defect.
Staging Info: By selecting yes to the question above you will include information on AJCC 8 tumor staging in your Mohs note. Information on tumor staging will be automatically added for SCCs on the head and neck. AJCC 8 includes tumor size, tumor depth, perineural involvement and bone invasion.
Trilobed Flap Text: The defect edges were debeveled with a #15 scalpel blade. Given the location of the defect and the proximity to free margins a trilobed flap was deemed most appropriate. Using a sterile surgical marker, an appropriate trilobed flap was drawn around the defect. The area thus outlined was incised deep to adipose tissue with a #15 scalpel blade. The skin margins were undermined to an appropriate distance in all directions utilizing iris scissors. Following this, the designed flap was carried into the primary defect and sutured into place.
Tumor Depth: Less than 6mm from granular layer and no invasion beyond the subcutaneous fat
Epidermal Closure Graft Donor Site (Optional): simple interrupted
Consent (Ear)/Introductory Paragraph: The rationale for Mohs was explained to the patient and consent was obtained. The risks, benefits and alternatives to therapy were discussed in detail. Specifically, the risks of ear deformity, infection, scarring, bleeding, prolonged wound healing, incomplete removal, allergy to anesthesia, nerve injury and recurrence were addressed. Prior to the procedure, the treatment site was clearly identified and confirmed by the patient. All components of Universal Protocol/PAUSE Rule completed.
Helical Rim Advancement Flap Text: The defect edges were debeveled with a #15 blade scalpel.  Given the location of the defect and the proximity to free margins (helical rim) a double helical rim advancement flap was deemed most appropriate. Using a sterile surgical marker, the appropriate advancement flaps were drawn incorporating the defect and placing the expected incisions between the helical rim and antihelix where possible.  The area thus outlined was incised through and through with a #15 scalpel blade.  With a skin hook and iris scissors, the flaps were gently and sharply undermined and freed up. Folllowing this, the designed flaps were carried over into the primary defect and sutured into place.
Area M Indication Text: Tumors in this location are included in Area M (cheek, forehead, scalp, neck, jawline and pretibial skin).  Mohs surgery is indicated for tumors in these anatomic locations.
Banner Transposition Flap Text: The defect edges were debeveled with a #15 scalpel blade. Given the location of the defect and the proximity to free margins a Banner transposition flap was deemed most appropriate. Using a sterile surgical marker, an appropriate flap was drawn around the defect. The area thus outlined was incised deep to adipose tissue with a #15 scalpel blade. The skin margins were undermined to an appropriate distance in all directions utilizing iris scissors. Following this, the designed flap was carried into the primary defect and sutured into place.
Body Location Override (Optional - Billing Will Still Be Based On Selected Body Map Location If Applicable): Left Anterior Earlobe / Antitragus
Adjacent Tissue Transfer Text: The defect edges were debeveled with a #15 scalpel blade. Given the location of the defect and the proximity to free margins an adjacent tissue transfer was deemed most appropriate. Using a sterile surgical marker, an appropriate flap was drawn incorporating the defect and placing the expected incisions within the relaxed skin tension lines where possible. The area thus outlined was incised deep to adipose tissue with a #15 scalpel blade. The skin margins were undermined to an appropriate distance in all directions utilizing iris scissors and carried over to close the primary defect.
Repair Type: Complex Repair
Skin Substitute Text: The defect edges were debeveled with a #15 scalpel blade. Given the location of the defect, shape of the defect and the proximity to free margins a skin substitute graft was deemed most appropriate.  The graft material was trimmed to fit the size of the defect. The graft was then placed in the primary defect and oriented appropriately.
Hemigard Retention Suture: 2-0 Nylon
Pain Refusal Text: I offered to prescribe pain medication but the patient refused to take this medication.
Intermediate Repair And Flap Additional Text (Will Appearing After The Standard Complex Repair Text): The intermediate repair was not sufficient to completely close the primary defect. The remaining additional defect was repaired with the flap mentioned below.
Localized Dermabrasion With 15 Blade Text: The patient was draped in routine manner.  Localized dermabrasion using a 15 blade was performed in routine manner to papillary dermis. This spot dermabrasion is being performed to complete skin cancer reconstruction. It also will eliminate the other sun damaged precancerous cells that are known to be part of the regional effect of a lifetime's worth of sun exposure. This localized dermabrasion is therapeutic and should not be considered cosmetic in any regard.
V-Y Flap Text: The defect edges were debeveled with a #15 scalpel blade. Given the location of the defect, shape of the defect and the proximity to free margins a V-Y flap was deemed most appropriate. Using a sterile surgical marker, an appropriate advancement flap was drawn incorporating the defect and placing the expected incisions within the relaxed skin tension lines where possible. The area thus outlined was incised deep to adipose tissue with a #15 scalpel blade. The skin margins were undermined to an appropriate distance in all directions utilizing iris scissors. Following this, the designed flap was advanced and carried over into the primary defect and sutured into place.
Alar Island Pedicle Flap Text: The defect edges were debeveled with a #15 scalpel blade. Given the location of the defect, shape of the defect and the proximity to the alar rim an island pedicle advancement flap was deemed most appropriate. Using a sterile surgical marker, an appropriate advancement flap was drawn incorporating the defect, outlining the appropriate donor tissue and placing the expected incisions within the nasal ala running parallel to the alar rim. The area thus outlined was incised with a #15 scalpel blade. The skin margins were undermined minimally to an appropriate distance in all directions around the primary defect and laterally outward around the island pedicle utilizing iris scissors.  There was minimal undermining beneath the pedicle flap. Following this, the designed flap was carried over into the primary defect and sutured into place.
Transposition Flap Text: The defect edges were debeveled with a #15 scalpel blade. Given the location of the defect and the proximity to free margins a transposition flap was deemed most appropriate. Using a sterile surgical marker, an appropriate transposition flap was drawn incorporating the defect. The area thus outlined was incised deep to adipose tissue with a #15 scalpel blade. The skin margins were undermined to an appropriate distance in all directions utilizing iris scissors. Following this, the designed flap was carried over into the primary defect and sutured into place.
O-Z Flap Text: The defect edges were debeveled with a #15 scalpel blade. Given the location of the defect, shape of the defect and the proximity to free margins an O-Z flap was deemed most appropriate. Using a sterile surgical marker, an appropriate transposition flap was drawn incorporating the defect and placing the expected incisions within the relaxed skin tension lines where possible. The area thus outlined was incised deep to adipose tissue with a #15 scalpel blade. The skin margins were undermined to an appropriate distance in all directions utilizing iris scissors. Following this, the designed flap was carried over into the primary defect and sutured into place.
Mauc Instructions: By selecting yes to the question below the MAUC number will be added into the note.  This will be calculated automatically based on the diagnosis chosen, the size entered, the body zone selected (H,M,L) and the specific indications you chose. You will also have the option to override the Mohs AUC if you disagree with the automatically calculated number and this option is found in the Case Summary tab.
Vermilion Border Text: The closure involved the vermilion border.
Retention Suture Bite Size: 3 mm
Abbe Flap (Lower To Upper Lip) Text: The defect of the upper lip was assessed and measured.  Given the location and size of the defect, an Abbe flap was deemed most appropriate. Using a sterile surgical marker, an appropriate Abbe flap was measured and drawn on the lower lip. Local anesthesia was then infiltrated. A scalpel was then used to incise the upper lip through and through the skin, vermilion, muscle and mucosa, leaving the flap pedicled on the opposite side.  The flap was then rotated and transferred to the lower lip defect.  The flap was then sutured into place with a three layer technique, closing the orbicularis oris muscle layer with subcutaneous buried sutures, followed by a mucosal layer and an epidermal layer.
Bilobed Flap Text: The defect edges were debeveled with a #15 scalpel blade. Given the location of the defect and the proximity to free margins a bilobe flap was deemed most appropriate. Using a sterile surgical marker, an appropriate bilobe flap drawn around the defect. The area thus outlined was incised deep to adipose tissue with a #15 scalpel blade. The skin margins were undermined to an appropriate distance in all directions utilizing iris scissors. Following this, the designed flap was carried over into the primary defect and sutured into place.
Double O-Z Flap Text: The defect edges were debeveled with a #15 scalpel blade. Given the location of the defect, shape of the defect and the proximity to free margins a Double O-Z flap was deemed most appropriate. Using a sterile surgical marker, an appropriate transposition flap was drawn incorporating the defect and placing the expected incisions within the relaxed skin tension lines where possible. The area thus outlined was incised deep to adipose tissue with a #15 scalpel blade. The skin margins were undermined to an appropriate distance in all directions utilizing iris scissors. Following this, the designed flap was carried over into the primary defect and sutured into place.
Mustarde Flap Text: The defect edges were debeveled with a #15 scalpel blade.  Given the size, depth and location of the defect and the proximity to free margins a Mustarde flap was deemed most appropriate. Using a sterile surgical marker, an appropriate flap was drawn incorporating the defect. The area thus outlined was incised with a #15 scalpel blade. The skin margins were undermined to an appropriate distance in all directions utilizing iris scissors. Following this, the designed flap was carried into the primary defect and sutured into place.
Orbicularis Oris Muscle Flap Text: The defect edges were debeveled with a #15 scalpel blade.  Given that the defect affected the competency of the oral sphincter an orbicularis oris muscle flap was deemed most appropriate to restore this competency and normal muscle function.  Using a sterile surgical marker, an appropriate flap was drawn incorporating the defect. The area thus outlined was incised with a #15 scalpel blade. Following this, the designed flap was carried over into the primary defect and sutured into place.
Closure 2 Information: This tab is for additional flaps and grafts, including complex repair and grafts and complex repair and flaps. You can also specify a different location for the additional defect, if the location is the same you do not need to select a new one. We will insert the automated text for the repair you select below just as we do for solitary flaps and grafts. Please note that at this time if you select a location with a different insurance zone you will need to override the ICD10 and CPT if appropriate.
A-T Advancement Flap Text: The defect edges were debeveled with a #15 scalpel blade. Given the location of the defect, shape of the defect and the proximity to free margins an A-T advancement flap was deemed most appropriate. Using a sterile surgical marker, an appropriate advancement flap was drawn incorporating the defect and placing the expected incisions within the relaxed skin tension lines where possible. The area thus outlined was incised deep to adipose tissue with a #15 scalpel blade. The skin margins were undermined to an appropriate distance in all directions utilizing iris scissors. Following this, the designed flap was advanced and carried over into the primary defect and sutured into place.
Consent (Nose)/Introductory Paragraph: The rationale for Mohs was explained to the patient and consent was obtained. The risks, benefits and alternatives to therapy were discussed in detail. Specifically, the risks of nasal deformity, changes in the flow of air through the nose, infection, scarring, bleeding, prolonged wound healing, incomplete removal, allergy to anesthesia, nerve injury and recurrence were addressed. Prior to the procedure, the treatment site was clearly identified and confirmed by the patient. All components of Universal Protocol/PAUSE Rule completed.
Cheek Interpolation Flap Text: A decision was made to reconstruct the defect utilizing an interpolation axial flap and a staged reconstruction.  A telfa template was made of the defect.  This telfa template was then used to outline the Cheek Interpolation flap.  The donor area for the pedicle flap was then injected with anesthesia.  The flap was excised through the skin and subcutaneous tissue down to the layer of the underlying musculature.  The interpolation flap was carefully excised within this deep plane to maintain its blood supply.  The edges of the donor site were undermined.   The donor site was closed in a primary fashion.  The pedicle was then rotated into position and sutured.  Once the tube was sutured into place, adequate blood supply was confirmed with blanching and refill.  The pedicle was then wrapped with xeroform gauze and dressed appropriately with a telfa and gauze bandage to ensure continued blood supply and protect the attached pedicle.
Dressing: dry sterile dressing
Complex Repair And Graft Additional Text (Will Appearing After The Standard Complex Repair Text): The complex repair was not sufficient to completely close the primary defect. The remaining additional defect was repaired with the graft mentioned below.
Which Instrument Did You Use For Dermabrasion?: Wire Brush
Mercedes Flap Text: The defect edges were debeveled with a #15 scalpel blade. Given the location of the defect, shape of the defect and the proximity to free margins a Mercedes flap was deemed most appropriate. Using a sterile surgical marker, an appropriate advancement flap was drawn incorporating the defect and placing the expected incisions within the relaxed skin tension lines where possible. The area thus outlined was incised deep to adipose tissue with a #15 scalpel blade. The skin margins were undermined to an appropriate distance in all directions utilizing iris scissors. Following this, the designed flap was advanced and carried over into the primary defect and sutured into place.
No Residual Tumor Seen Histology Text: There were no malignant cells seen in the sections examined.
Ftsg Text: The defect edges were debeveled with a #15 scalpel blade. Given the location of the defect, shape of the defect and the proximity to free margins a full thickness skin graft was deemed most appropriate. Using a sterile surgical marker, the primary defect shape was transferred to the donor site. The area thus outlined was incised deep to adipose tissue with a #15 scalpel blade.  The harvested graft was then trimmed of adipose tissue until only dermis and epidermis was left.  The skin margins of the secondary defect were undermined to an appropriate distance in all directions utilizing iris scissors.  The secondary defect was closed with interrupted buried subcutaneous sutures.  The skin edges were then re-apposed with running  sutures.  The skin graft was then placed in the primary defect and oriented appropriately.
Tissue Cultured Epidermal Autograft Text: The defect edges were debeveled with a #15 scalpel blade. Given the location of the defect, shape of the defect and the proximity to free margins a tissue cultured epidermal autograft was deemed most appropriate.  The graft was then trimmed to fit the size of the defect.  The graft was then placed in the primary defect and oriented appropriately.
Mastoid Interpolation Flap Text: A decision was made to reconstruct the defect utilizing an interpolation axial flap and a staged reconstruction.  A telfa template was made of the defect.  This telfa template was then used to outline the mastoid interpolation flap.  The donor area for the pedicle flap was then injected with anesthesia.  The flap was excised through the skin and subcutaneous tissue down to the layer of the underlying musculature.  The pedicle flap was carefully excised within this deep plane to maintain its blood supply.  The edges of the donor site were undermined.   The donor site was closed in a primary fashion.  The pedicle was then rotated into position and sutured.  Once the tube was sutured into place, adequate blood supply was confirmed with blanching and refill.  The pedicle was then wrapped with xeroform gauze and dressed appropriately with a telfa and gauze bandage to ensure continued blood supply and protect the attached pedicle.
O-T Advancement Flap Text: In order to preserve normal anatomic and functional relationships, an O-T advancement flap was deemed the most appropriate reconstructive procedure.  The beveled edges of the Mohs defect were excised with a #15 scalpel blade.    The flap was designed to fall along relaxed skin tension lines and/or free margins, incised, and elevated using blunt curved tissue scissors.  Hemostasis was achieved.  Interrupted buried vertical mattress sutures were employed to carry over and secure the flap in place.  Redundant cutaneous columns defined by the flap's movement were removed to the adipose layer using the triangulation technique and repaired in similar fashion.  Final epidermal approximation along the length of the flap was achieved using epidermal sutures.  The wound was stressed in various directions to ensure the adequacy of the closure and of hemostasis.  The flap edges appeared pink and well perfused.  Reconstruction was considered complete.
H Plasty Text: Given the location of the defect, shape of the defect and the proximity to free margins a H-plasty was deemed most appropriate for repair. Using a sterile surgical marker, the appropriate advancement arms of the H-plasty were drawn incorporating the defect and placing the expected incisions within the relaxed skin tension lines where possible. The area thus outlined was incised deep to adipose tissue with a #15 scalpel blade. The skin margins were undermined to an appropriate distance in all directions utilizing iris scissors.  The opposing advancement arms were then advanced and carried over into place in opposite direction and anchored with interrupted buried subcutaneous sutures.
Mohs Method Verbiage: An incision at a 45 degree angle following the standard Mohs approach was done and the specimen was harvested as a microscopic controlled layer.
Graft Cartilage Fenestration Text: The cartilage was fenestrated with a 2mm punch biopsy to help facilitate graft survival and healing.
Primary Defect Width In Cm (Final Defect Size - Required For Flaps/Grafts): 1.5
Area H Indication Text: Tumors in this location are included in Area H (eyelids, eyebrows, nose, lips, chin, ear, pre-auricular, post-auricular, temple, genitalia, hands, feet, ankles and areola).  Tissue conservation is critical in these anatomic locations.
Nasolabial Transposition Flap Text: The defect edges were debeveled with a #15 scalpel blade.  Given the size, depth and location of the defect and the proximity to free margins a nasolabial transposition flap was deemed most appropriate. Using a sterile surgical marker, an appropriate flap was drawn incorporating the defect. The area thus outlined was incised with a #15 scalpel blade. The skin margins were undermined to an appropriate distance in all directions utilizing iris scissors. Following this, the designed flap was carried into the primary defect and sutured into place.
Mart-1 - Negative Histology Text: MART-1 staining demonstrates a normal density and pattern of melanocytes along the dermal-epidermal junction. The surgical margins are negative for tumor cells.
Consent 2/Introductory Paragraph: Mohs surgery was explained to the patient and consent was obtained. The risks, benefits and alternatives to therapy were discussed in detail. Specifically, the risks of infection, scarring, bleeding, prolonged wound healing, incomplete removal, allergy to anesthesia, nerve injury and recurrence were addressed. Prior to the procedure, the treatment site was clearly identified and confirmed by the patient. All components of Universal Protocol/PAUSE Rule completed.
W Plasty Text: The lesion was extirpated to the level of the fat with a #15 scalpel blade. Given the location of the defect, shape of the defect and the proximity to free margins a W-plasty was deemed most appropriate for repair. Using a sterile surgical marker, the appropriate transposition arms of the W-plasty were drawn incorporating the defect and placing the expected incisions within the relaxed skin tension lines where possible. The area thus outlined was incised deep to adipose tissue with a #15 scalpel blade. The skin margins were undermined to an appropriate distance in all directions utilizing iris scissors. The opposing transposition arms were then transposed and carried over into place in opposite direction and anchored with interrupted buried subcutaneous sutures.
Nasalis Myocutaneous Flap Text: Using a #15 blade, an incision was made around the donor flap to the level of the nasalis muscle. Wide lateral undermining was then performed in both the subcutaneous plane above the nasalis muscle, and in a submuscular plane just above periosteum. This allowed the formation of a free nasalis muscle axial pedicle which was still attached to the actual cutaneous flap, increasing its mobility and vascular viability. Hemostasis was obtained with pinpoint electrocoagulation. The flap was mobilized into position and the pivotal anchor points positioned and stabilized with buried interrupted sutures. Subcutaneous and dermal tissues were closed in a multilayered fashion with sutures. Tissue redundancies were excised, and the epidermal edges were apposed without significant tension and sutured with sutures.
Bcc Histology Text: There were numerous aggregates of basaloid cells.
Medical Necessity Statement: Based on my medical judgement, Mohs surgery is the most appropriate treatment for this cancer compared to other treatments.
Suture Removal: 7 days
Epidermal Sutures: 6-0 Polypropylene
Consent (Temporal Branch)/Introductory Paragraph: The rationale for Mohs was explained to the patient and consent was obtained. The risks, benefits and alternatives to therapy were discussed in detail. Specifically, the risks of damage to the temporal branch of the facial nerve, infection, scarring, bleeding, prolonged wound healing, incomplete removal, allergy to anesthesia, and recurrence were addressed. Prior to the procedure, the treatment site was clearly identified and confirmed by the patient. All components of Universal Protocol/PAUSE Rule completed.
Hemigard Postcare Instructions: The HEMIGARD strips are to remain completely dry for at least 5-7 days.
Complex Repair Preamble Text (Leave Blank If You Do Not Want): Extensive wide and differential undermining were performed.  During reconstruction, hemostasis was performed using electrofulguration.  Initial buried interrupted vertical mattress suture(s) defined redundant cutaneous columns (Burow's triangles) which were removed to the level of the adipose tissue using a #15 blade scalpel and the triangulation technique.  Hemostasis was obtained and the resulting defect was repaired with the same suture material and techniques.  The epidermis was then carefully apposed using polypropylene suture (running).  The wound was stressed in various directions to insure the adequacy of closure and of hemostasis.  The wound edges were pink and well perfused.  Reconstruction was considered complete.
Island Pedicle Flap With Canthal Suspension Text: The defect edges were debeveled with a #15 scalpel blade. Given the location of the defect, shape of the defect and the proximity to free margins an island pedicle advancement flap was deemed most appropriate. Using a sterile surgical marker, an appropriate advancement flap was drawn incorporating the defect, outlining the appropriate donor tissue and placing the expected incisions within the relaxed skin tension lines where possible. The area thus outlined was incised deep to adipose tissue with a #15 scalpel blade. The skin margins were undermined to an appropriate distance in all directions around the primary defect and laterally outward around the island pedicle utilizing iris scissors.  There was minimal undermining beneath the pedicle flap. A suspension suture was placed in the canthal tendon to prevent tension and prevent ectropion. Following this, the designed flap was placed into the primary defect and sutured into place.
Anesthesia Type: 1% lidocaine with epinephrine and a 1:10 solution of 8.4% sodium bicarbonate
Nostril Rim Text: The closure involved the nostril rim.
Split-Thickness Skin Graft Text: The defect edges were debeveled with a #15 scalpel blade. Given the location of the defect, shape of the defect and the proximity to free margins a split thickness skin graft was deemed most appropriate. Using a sterile surgical marker, the primary defect shape was transferred to the donor site. The split thickness graft was then harvested.  The skin graft was then placed in the primary defect and oriented appropriately.
Estimated Blood Loss (Cc): minimal
Unna Boot Text: An Unna boot was placed to help immobilize the limb and facilitate more rapid healing.
Epidermal Autograft Text: The defect edges were debeveled with a #15 scalpel blade. Given the location of the defect, shape of the defect and the proximity to free margins an epidermal autograft was deemed most appropriate. Using a sterile surgical marker, the primary defect shape was transferred to the donor site. The epidermal graft was then harvested.  The skin graft was then placed in the primary defect and oriented appropriately.
Purse String (Intermediate) Text: Given the location of the defect and the characteristics of the surrounding skin a purse string intermediate closure was deemed most appropriate.  Undermining was performed circumferentially around the surgical defect.  A purse string suture was then placed and tightened.
Staged Advancement Flap Text: The defect edges were debeveled with a #15 scalpel blade. Given the location of the defect, shape of the defect and the proximity to free margins a staged advancement flap was deemed most appropriate. Using a sterile surgical marker, an appropriate advancement flap was drawn incorporating the defect and placing the expected incisions within the relaxed skin tension lines where possible. The area thus outlined was incised deep to adipose tissue with a #15 scalpel blade. The skin margins were undermined to an appropriate distance in all directions utilizing iris scissors. Following this, the designed flap was carried over into the primary defect and sutured into place.
Bcc Infiltrative Histology Text: There were numerous aggregates of basaloid cells demonstrating an infiltrative pattern.
Consent (Marginal Mandibular)/Introductory Paragraph: The rationale for Mohs was explained to the patient and consent was obtained. The risks, benefits and alternatives to therapy were discussed in detail. Specifically, the risks of damage to the marginal mandibular branch of the facial nerve, infection, scarring, bleeding, prolonged wound healing, incomplete removal, allergy to anesthesia, and recurrence were addressed. Prior to the procedure, the treatment site was clearly identified and confirmed by the patient. All components of Universal Protocol/PAUSE Rule completed.
Nasalis-Muscle-Based Myocutaneous Island Pedicle Flap Text: Using a #15 blade, an incision was made around the donor flap to the level of the nasalis muscle. Wide lateral undermining was then performed in both the subcutaneous plane above the nasalis muscle, and in a submuscular plane just above periosteum. This allowed the formation of a free nasalis muscle axial pedicle (based on the angular artery) which was still attached to the actual cutaneous flap, increasing its mobility and vascular viability. Hemostasis was obtained with pinpoint electrocoagulation. The flap was mobilized into position and the pivotal anchor points positioned and stabilized with buried interrupted sutures. Subcutaneous and dermal tissues were closed in a multilayered fashion with sutures. Tissue redundancies were excised, and the epidermal edges were apposed without significant tension and sutured with sutures.
Graft Donor Site Bandage (Optional-Leave Blank If You Don't Want In Note): Steri-strips and a pressure bandage were applied to the donor site.
Ear Star Wedge Flap Text: The defect edges were debeveled with a #15 blade scalpel.  Given the location of the defect and the proximity to free margins (helical rim) an ear star wedge flap was deemed most appropriate. Using a sterile surgical marker, the appropriate flap was drawn incorporating the defect and placing the expected incisions between the helical rim and antihelix where possible.  The area thus outlined was incised through and through with a #15 scalpel blade. Following this, the designed flap was carried over into the primary defect and sutured into place.
Consent (Lip)/Introductory Paragraph: The rationale for Mohs was explained to the patient and consent was obtained. The risks, benefits and alternatives to therapy were discussed in detail. Specifically, the risks of lip deformity, changes in the oral aperture, infection, scarring, bleeding, prolonged wound healing, incomplete removal, allergy to anesthesia, nerve injury and recurrence were addressed. Prior to the procedure, the treatment site was clearly identified and confirmed by the patient. All components of Universal Protocol/PAUSE Rule completed.
Undermining Type: One Wound Edge
Complex Repair And Flap Additional Text (Will Appearing After The Standard Complex Repair Text): The complex repair was not sufficient to completely close the primary defect. The remaining additional defect was repaired with the flap mentioned below.
Spiral Flap Text: The defect edges were debeveled with a #15 scalpel blade. Given the location of the defect, shape of the defect and the proximity to free margins a spiral flap was deemed most appropriate. Using a sterile surgical marker, an appropriate rotation flap was drawn incorporating the defect and placing the expected incisions within the relaxed skin tension lines where possible. The area thus outlined was incised deep to adipose tissue with a #15 scalpel blade. The skin margins were undermined to an appropriate distance in all directions utilizing iris scissors. Following this, the designed flap was carried over into the primary defect and sutured into place.
Surgeon/Pathologist Verbiage (Will Incorporate Name Of Surgeon From Intro If Not Blank): operated in two distinct and integrated capacities as the surgeon and pathologist.
Referring Physician (Optional): Nalini Contreras MD
Hemostasis: Electrofulguration
Bilateral Rotation Flap Text: The defect edges were debeveled with a #15 scalpel blade. Given the location of the defect, shape of the defect and the proximity to free margins a bilateral rotation flap was deemed most appropriate. Using a sterile surgical marker, an appropriate rotation flap was drawn incorporating the defect and placing the expected incisions within the relaxed skin tension lines where possible. The area thus outlined was incised deep to adipose tissue with a #15 scalpel blade. The skin margins were undermined to an appropriate distance in all directions utilizing iris scissors. Following this, the designed flap was carried over into the primary defect and sutured into place.
Advancement-Rotation Flap Text: The defect edges were debeveled with a #15 scalpel blade. Given the location of the defect, shape of the defect and the proximity to free margins an advancement-rotation flap was deemed most appropriate. Using a sterile surgical marker, an appropriate flap was drawn incorporating the defect and placing the expected incisions within the relaxed skin tension lines where possible. The area thus outlined was incised deep to adipose tissue with a #15 scalpel blade. The skin margins were undermined to an appropriate distance in all directions utilizing iris scissors. Following this, the designed flap was carried over into the primary defect and sutured into place.
Burow's Graft Text: The defect edges were debeveled with a #15 scalpel blade. Given the location of the defect, shape of the defect, the proximity to free margins and the presence of a standing cone deformity a Burow's skin graft was deemed most appropriate. The standing cone was removed and this tissue was then trimmed to the shape of the primary defect. The adipose tissue was also removed until only dermis and epidermis were left.  The skin margins of the secondary defect were undermined to an appropriate distance in all directions utilizing iris scissors.  The secondary defect was closed with interrupted buried subcutaneous sutures.  The skin edges were then re-apposed with running  sutures.  The skin graft was then placed in the primary defect and oriented appropriately.
Post-Care Instructions: I reviewed with the patient in detail post-care instructions. Patient is not to engage in any heavy lifting, exercise, or swimming for the next 14 days. Should the patient develop any fevers, chills, bleeding, severe pain patient will contact the office immediately.
Modified Advancement Flap Text: The defect edges were debeveled with a #15 scalpel blade. Given the location of the defect, shape of the defect and the proximity to free margins a modified advancement flap was deemed most appropriate. Using a sterile surgical marker, an appropriate advancement flap was drawn incorporating the defect and placing the expected incisions within the relaxed skin tension lines where possible. The area thus outlined was incised deep to adipose tissue with a #15 scalpel blade. The skin margins were undermined to an appropriate distance in all directions utilizing iris scissors. Following this, the designed flap was advanced and carried over into the primary defect and sutured into place.
Rotation Flap Text: The defect edges were debeveled with a #15 scalpel blade. Given the location of the defect, shape of the defect and the proximity to free margins a rotation flap was deemed most appropriate. Using a sterile surgical marker, an appropriate rotation flap was drawn incorporating the defect and placing the expected incisions within the relaxed skin tension lines where possible. The area thus outlined was incised deep to adipose tissue with a #15 scalpel blade. The skin margins were undermined to an appropriate distance in all directions utilizing iris scissors. Following this, the designed flap was carried over into the primary defect and sutured into place.
Suturegard Intro: Intraoperative tissue expansion was performed, utilizing the SUTUREGARD device, in order to reduce wound tension.
Interpolation Flap Text: A decision was made to reconstruct the defect utilizing an interpolation axial flap and a staged reconstruction.  A telfa template was made of the defect.  This telfa template was then used to outline the interpolation flap.  The donor area for the pedicle flap was then injected with anesthesia.  The flap was excised through the skin and subcutaneous tissue down to the layer of the underlying musculature.  The interpolation flap was carefully excised within this deep plane to maintain its blood supply.  The edges of the donor site were undermined.   The donor site was closed in a primary fashion.  The pedicle was then rotated into position and sutured.  Once the tube was sutured into place, adequate blood supply was confirmed with blanching and refill.  The pedicle was then wrapped with xeroform gauze and dressed appropriately with a telfa and gauze bandage to ensure continued blood supply and protect the attached pedicle.
Partial Purse String (Intermediate) Text: Given the location of the defect and the characteristics of the surrounding skin an intermediate purse string closure was deemed most appropriate.  Undermining was performed circumferentially around the surgical defect.  A purse string suture was then placed and tightened. Wound tension only allowed a partial closure of the circular defect.
Crescentic Advancement Flap Text: The defect edges were debeveled with a #15 scalpel blade. Given the location of the defect and the proximity to free margins a crescentic advancement flap was deemed most appropriate. Using a sterile surgical marker, the appropriate advancement flap was drawn incorporating the defect and placing the expected incisions within the relaxed skin tension lines where possible. The area thus outlined was incised deep to adipose tissue with a #15 scalpel blade. The skin margins were undermined to an appropriate distance in all directions utilizing iris scissors. Following this, the designed flap was advanced and carried over into the primary defect and sutured into place.
Keystone Flap Text: The defect edges were debeveled with a #15 scalpel blade. Given the location of the defect, shape of the defect a keystone flap was deemed most appropriate. Using a sterile surgical marker, an appropriate keystone flap was drawn incorporating the defect, outlining the appropriate donor tissue and placing the expected incisions within the relaxed skin tension lines where possible. The area thus outlined was incised deep to adipose tissue with a #15 scalpel blade. The skin margins were undermined to an appropriate distance in all directions around the primary defect and laterally outward around the flap utilizing iris scissors. Following this, the designed flap was carried into the primary defect and sutured into place.
Information: Selecting Yes will display possible errors in your note based on the variables you have selected. This validation is only offered as a suggestion for you. PLEASE NOTE THAT THE VALIDATION TEXT WILL BE REMOVED WHEN YOU FINALIZE YOUR NOTE. IF YOU WANT TO FAX A PRELIMINARY NOTE YOU WILL NEED TO TOGGLE THIS TO 'NO' IF YOU DO NOT WANT IT IN YOUR FAXED NOTE.
Mohs Rapid Report Verbiage: The area of clinically evident tumor was marked with skin marking ink and appropriately hatched.  The initial incision was made following the Mohs approach through the skin.  The specimen was taken to the lab, divided into the necessary number of pieces, chromacoded and processed according to the Mohs protocol.  This was repeated in successive stages until a tumor free defect was achieved.
Consent (Spinal Accessory)/Introductory Paragraph: The rationale for Mohs was explained to the patient and consent was obtained. The risks, benefits and alternatives to therapy were discussed in detail. Specifically, the risks of damage to the spinal accessory nerve, infection, scarring, bleeding, prolonged wound healing, incomplete removal, allergy to anesthesia, and recurrence were addressed. Prior to the procedure, the treatment site was clearly identified and confirmed by the patient. All components of Universal Protocol/PAUSE Rule completed.
Hemigard Intro: Due to skin fragility and wound tension, it was decided to use HEMIGARD adhesive retention suture devices to permit a linear closure. The skin was cleaned and dried for a 6cm distance away from the wound. Excessive hair, if present, was removed to allow for adhesion.
Advancement Flap (Single) Text: In order to preserve normal anatomic and functional relationships, a single advancement flap was deemed the most appropriate reconstructive procedure.  The beveled edges of the Mohs defect were excised with a #15 scalpel blade.    The flap was designed to fall along relaxed skin tension lines and/or free margins, incised, and elevated using blunt curved tissue scissors.  Hemostasis was achieved.  Interrupted buried vertical mattress sutures were employed to carry over and secure the flap in place.  Redundant cutaneous columns defined by the flap's movement were removed to the adipose layer using the triangulation technique and repaired in similar fashion.  Final epidermal approximation along the length of the flap was achieved using epidermal sutures.  The wound was stressed in various directions to ensure the adequacy of the closure and of hemostasis.  The flap edges appeared pink and well perfused.  Reconstruction was considered complete.
Localized Dermabrasion With Sand Papertext: The patient was draped in routine manner.  Localized dermabrasion using sterile sand paper was performed in routine manner to papillary dermis. This spot dermabrasion is being performed to complete skin cancer reconstruction. It also will eliminate the other sun damaged precancerous cells that are known to be part of the regional effect of a lifetime's worth of sun exposure. This localized dermabrasion is therapeutic and should not be considered cosmetic in any regard.
Same Histology In Subsequent Stages Text: The pattern and morphology of the tumor is as described in the first stage.
Epidermal Closure: running and interrupted
Retention Suture Text: Retention sutures were placed to support the closure and prevent dehiscence.
O-T Plasty Text: The defect edges were debeveled with a #15 scalpel blade. Given the location of the defect, shape of the defect and the proximity to free margins an O-T plasty was deemed most appropriate. Using a sterile surgical marker, an appropriate O-T plasty was drawn incorporating the defect and placing the expected incisions within the relaxed skin tension lines where possible. The area thus outlined was incised deep to adipose tissue with a #15 scalpel blade. The skin margins were undermined to an appropriate distance in all directions utilizing iris scissors. Following this, the designed flap was carried over into the primary defect and sutured into place.
Dermal Autograft Text: The defect edges were debeveled with a #15 scalpel blade. Given the location of the defect, shape of the defect and the proximity to free margins a dermal autograft was deemed most appropriate. Using a sterile surgical marker, the primary defect shape was transferred to the donor site. The area thus outlined was incised deep to adipose tissue with a #15 scalpel blade.  The harvested graft was then trimmed of adipose and epidermal tissue until only dermis was left.  The skin graft was then placed in the primary defect and oriented appropriately.
Area L Indication Text: Tumors in this location are included in Area L (trunk and extremities).  Mohs surgery is indicated for larger tumors, or tumors with aggressive histologic features, in these anatomic locations.
Debridement Text: The wound edges were debrided prior to proceeding with the closure to facilitate wound healing.
Bill 59 Modifier?: No - Continue to Bill 79 Modifier
Eyelid Partial Thickness Repair - 55116: The eyelid defect was partial thickness which required a wedge repair of the eyelid. Special care was taken to ensure that the eyelid margin was realligned when placing sutures.
O-Z Plasty Text: The defect edges were debeveled with a #15 scalpel blade. Given the location of the defect, shape of the defect and the proximity to free margins an O-Z plasty (double transposition flap) was deemed most appropriate. Using a sterile surgical marker, the appropriate transposition flaps were drawn incorporating the defect and placing the expected incisions within the relaxed skin tension lines where possible. The area thus outlined was incised deep to adipose tissue with a #15 scalpel blade. The skin margins were undermined to an appropriate distance in all directions utilizing iris scissors. Hemostasis was achieved with electrocautery. The flaps were then transposed and carried over into place, one clockwise and the other counterclockwise, and anchored with interrupted buried subcutaneous sutures.
Consent Type: Consent 2
Double Island Pedicle Flap Text: The defect edges were debeveled with a #15 scalpel blade. Given the location of the defect, shape of the defect and the proximity to free margins a double island pedicle advancement flap was deemed most appropriate. Using a sterile surgical marker, an appropriate advancement flap was drawn incorporating the defect, outlining the appropriate donor tissue and placing the expected incisions within the relaxed skin tension lines where possible. The area thus outlined was incised deep to adipose tissue with a #15 scalpel blade. The skin margins were undermined to an appropriate distance in all directions around the primary defect and laterally outward around the island pedicle utilizing iris scissors.  There was minimal undermining beneath the pedicle flap. Following this, the flap was carried over into the primary defect and sutured into place.
Mohs Case Number: [id number]
Full Thickness Lip Wedge Repair (Flap) Text: Given the location of the defect and the proximity to free margins a full thickness wedge repair was deemed most appropriate. Using a sterile surgical marker, the appropriate repair was drawn incorporating the defect and placing the expected incisions perpendicular to the vermilion border.  The vermilion border was also meticulously outlined to ensure appropriate reapproximation during the repair.  The area thus outlined was incised through and through with a #15 scalpel blade.  The muscularis and dermis were reaproximated with deep sutures following hemostasis. Care was taken to realign the vermilion border before proceeding with the superficial closure.  Once the vermilion was realigned the superfical and mucosal closure was finished.
Purse String (Simple) Text: Given the location of the defect and the characteristics of the surrounding skin a purse string closure was deemed most appropriate.  Undermining was performed circumferentially around the surgical defect.  A purse string suture was then placed and tightened.
Melolabial Transposition Flap Text: In order to preserve normal anatomic and functional relationships, a melolabial transposition flap was deemed the most appropriate reconstructive procedure.  The beveled edges of the Mohs defect were excised with a #15 scalpel blade.    The flap was designed to fall along relaxed skin tension lines and/or free margins, incised, and elevated using blunt curved tissue scissors.  Hemostasis was achieved.  Interrupted buried vertical mattress sutures were employed to carry over (transpose) and secure the flap in place.  Redundant cutaneous columns defined by the flap's movement were removed to the adipose layer using the triangulation technique and repaired in similar fashion.  Final epidermal approximation along the length of the flap was achieved using epidermal sutures.  The wound was stressed in various directions to ensure the adequacy of the closure and of hemostasis.  The flap edges appeared pink and well perfused.  Reconstruction was considered complete.
Bi-Rhombic Flap Text: The defect edges were debeveled with a #15 scalpel blade. Given the location of the defect and the proximity to free margins a bi-rhombic flap was deemed most appropriate. Using a sterile surgical marker, an appropriate rhombic flap was drawn incorporating the defect. The area thus outlined was incised deep to adipose tissue with a #15 scalpel blade. The skin margins were undermined to an appropriate distance in all directions utilizing iris scissors. Following this, the designed flap was carried over into the primary defect and sutured into place.
Suturegard Body: The suture ends were repeatedly re-tightened and re-clamped to achieve the desired tissue expansion.
Z Plasty Text: The lesion was extirpated to the level of the fat with a #15 scalpel blade. Given the location of the defect, shape of the defect and the proximity to free margins a Z-plasty was deemed most appropriate for repair. Using a sterile surgical marker, the appropriate transposition arms of the Z-plasty were drawn incorporating the defect and placing the expected incisions within the relaxed skin tension lines where possible. The area thus outlined was incised deep to adipose tissue with a #15 scalpel blade. The skin margins were undermined to an appropriate distance in all directions utilizing iris scissors. The opposing transposition arms were then transposed and carried over into place in opposite direction and anchored with interrupted buried subcutaneous sutures.
Brow Lift Text: A midfrontal incision was made medially to the defect to allow access to the tissues just superior to the left eyebrow. Following careful dissection inferiorly in a supraperiosteal plane to the level of the left eyebrow, several 3-0 monocryl sutures were used to resuspend the eyebrow orbicularis oculi muscular unit to the superior frontal bone periosteum. This resulted in an appropriate reapproximation of static eyebrow symmetry and correction of the left brow ptosis.
Secondary Intention Text (Leave Blank If You Do Not Want): The defect will heal with secondary intention.
Primary Defect Width In Cm (Final Defect Size - Required For Flaps/Grafts): 0.5
X Size Of Lesion In Cm (Optional): 0.3
Initial Size Of Lesion: 0.4
Secondary Defect Length In Cm (Required For Flaps): 3.5
Body Location Override (Optional - Billing Will Still Be Based On Selected Body Map Location If Applicable): Right Inferior Medial Cheek
Undermining Type: Entire Wound
Repair Type: Flap
Flap Type: Advancement Flap (Single)
Mohs Case Number: [id number]